# Patient Record
Sex: FEMALE | Race: WHITE | NOT HISPANIC OR LATINO | Employment: OTHER | RURAL
[De-identification: names, ages, dates, MRNs, and addresses within clinical notes are randomized per-mention and may not be internally consistent; named-entity substitution may affect disease eponyms.]

---

## 2018-07-18 ENCOUNTER — HISTORICAL (OUTPATIENT)
Dept: ADMINISTRATIVE | Facility: HOSPITAL | Age: 63
End: 2018-07-18

## 2018-07-20 LAB
LAB AP GENERAL CAT - HISTORICAL: NORMAL
LAB AP INTERPRETATION/RESULT - HISTORICAL: NEGATIVE
LAB AP SPECIMEN ADEQUACY - HISTORICAL: NORMAL
LAB AP SPECIMEN SUBMITTED - HISTORICAL: NORMAL

## 2020-08-06 ENCOUNTER — HISTORICAL (OUTPATIENT)
Dept: ADMINISTRATIVE | Facility: HOSPITAL | Age: 65
End: 2020-08-06

## 2020-08-06 LAB — SARS-COV+SARS-COV-2 AG RESP QL IA.RAPID: NEGATIVE

## 2020-08-12 ENCOUNTER — HISTORICAL (OUTPATIENT)
Dept: ADMINISTRATIVE | Facility: HOSPITAL | Age: 65
End: 2020-08-12

## 2020-08-12 LAB — SARS-COV+SARS-COV-2 AG RESP QL IA.RAPID: NEGATIVE

## 2020-10-09 ENCOUNTER — HISTORICAL (OUTPATIENT)
Dept: ADMINISTRATIVE | Facility: HOSPITAL | Age: 65
End: 2020-10-09

## 2020-10-10 LAB — SARS-COV+SARS-COV-2 AG RESP QL IA.RAPID: NEGATIVE

## 2020-10-15 ENCOUNTER — HISTORICAL (OUTPATIENT)
Dept: ADMINISTRATIVE | Facility: HOSPITAL | Age: 65
End: 2020-10-15

## 2021-01-27 ENCOUNTER — HISTORICAL (OUTPATIENT)
Dept: ADMINISTRATIVE | Facility: HOSPITAL | Age: 66
End: 2021-01-27

## 2021-01-27 LAB — SARS-COV+SARS-COV-2 AG RESP QL IA.RAPID: POSITIVE

## 2021-12-06 ENCOUNTER — HOSPITAL ENCOUNTER (OUTPATIENT)
Dept: RADIOLOGY | Facility: HOSPITAL | Age: 66
Discharge: HOME | End: 2021-12-06
Payer: COMMERCIAL

## 2021-12-06 VITALS — HEIGHT: 64 IN | WEIGHT: 148 LBS | BODY MASS INDEX: 25.27 KG/M2

## 2021-12-06 DIAGNOSIS — Z12.31 VISIT FOR SCREENING MAMMOGRAM: ICD-10-CM

## 2021-12-06 PROCEDURE — 77067 SCR MAMMO BI INCL CAD: CPT | Mod: 26,,, | Performed by: RADIOLOGY

## 2021-12-06 PROCEDURE — 77067 SCR MAMMO BI INCL CAD: CPT | Mod: TC

## 2021-12-06 PROCEDURE — 77067 MAMMO DIGITAL SCREENING BILAT: ICD-10-PCS | Mod: 26,,, | Performed by: RADIOLOGY

## 2022-01-13 RX ORDER — IBUPROFEN 800 MG/1
TABLET ORAL EVERY 4 HOURS PRN
COMMUNITY
Start: 2022-01-10

## 2022-01-13 RX ORDER — LEVOTHYROXINE, LIOTHYRONINE 19; 4.5 UG/1; UG/1
TABLET ORAL
COMMUNITY
Start: 2022-01-07

## 2022-01-13 RX ORDER — TRAZODONE HYDROCHLORIDE 100 MG/1
TABLET ORAL
COMMUNITY
Start: 2021-12-16

## 2022-01-13 RX ORDER — PROGESTERONE 200 MG/1
CAPSULE ORAL
COMMUNITY
Start: 2022-01-07

## 2022-01-13 RX ORDER — GUSELKUMAB 100 MG/ML
INJECTION SUBCUTANEOUS
COMMUNITY
Start: 2021-12-29

## 2022-01-13 RX ORDER — SECUKINUMAB 150 MG/ML
INJECTION SUBCUTANEOUS
Status: ON HOLD | COMMUNITY
Start: 2021-08-13 | End: 2022-01-14

## 2022-01-13 RX ORDER — OXYCODONE AND ACETAMINOPHEN 5; 325 MG/1; MG/1
TABLET ORAL
COMMUNITY
Start: 2022-01-10

## 2022-01-13 RX ORDER — MISOPROSTOL 200 UG/1
TABLET ORAL
Status: ON HOLD | COMMUNITY
Start: 2022-01-10 | End: 2022-01-14

## 2022-01-14 ENCOUNTER — HOSPITAL ENCOUNTER (OUTPATIENT)
Facility: HOSPITAL | Age: 67
Discharge: HOME | End: 2022-01-14
Attending: OBSTETRICS & GYNECOLOGY | Admitting: OBSTETRICS & GYNECOLOGY
Payer: COMMERCIAL

## 2022-01-14 ENCOUNTER — ANESTHESIA (OUTPATIENT)
Dept: SURGERY | Facility: HOSPITAL | Age: 67
End: 2022-01-14
Payer: COMMERCIAL

## 2022-01-14 ENCOUNTER — ANESTHESIA EVENT (OUTPATIENT)
Dept: SURGERY | Facility: HOSPITAL | Age: 67
End: 2022-01-14
Payer: COMMERCIAL

## 2022-01-14 VITALS
OXYGEN SATURATION: 97 % | SYSTOLIC BLOOD PRESSURE: 119 MMHG | HEIGHT: 64 IN | RESPIRATION RATE: 16 BRPM | BODY MASS INDEX: 26.12 KG/M2 | WEIGHT: 153 LBS | HEART RATE: 79 BPM | DIASTOLIC BLOOD PRESSURE: 50 MMHG | TEMPERATURE: 98 F

## 2022-01-14 DIAGNOSIS — N93.9 ABNORMAL UTERINE BLEEDING (AUB): ICD-10-CM

## 2022-01-14 DIAGNOSIS — N93.9 ABNORMAL UTERINE BLEEDING: ICD-10-CM

## 2022-01-14 DIAGNOSIS — N84.0 ENDOMETRIAL POLYP: ICD-10-CM

## 2022-01-14 PROCEDURE — 71000015 HC POSTOP RECOV 1ST HR: Performed by: OBSTETRICS & GYNECOLOGY

## 2022-01-14 PROCEDURE — 63600175 PHARM REV CODE 636 W HCPCS: Performed by: ANESTHESIOLOGY

## 2022-01-14 PROCEDURE — 37000008 HC ANESTHESIA 1ST 15 MINUTES: Performed by: OBSTETRICS & GYNECOLOGY

## 2022-01-14 PROCEDURE — D9220A PRA ANESTHESIA: Mod: ANES,,, | Performed by: ANESTHESIOLOGY

## 2022-01-14 PROCEDURE — 88305 TISSUE EXAM BY PATHOLOGIST: CPT | Mod: 26,,, | Performed by: PATHOLOGY

## 2022-01-14 PROCEDURE — 71000016 HC POSTOP RECOV ADDL HR: Performed by: OBSTETRICS & GYNECOLOGY

## 2022-01-14 PROCEDURE — C1782 MORCELLATOR: HCPCS | Performed by: OBSTETRICS & GYNECOLOGY

## 2022-01-14 PROCEDURE — 25000003 PHARM REV CODE 250: Performed by: ANESTHESIOLOGY

## 2022-01-14 PROCEDURE — 36000707: Performed by: OBSTETRICS & GYNECOLOGY

## 2022-01-14 PROCEDURE — 27201423 OPTIME MED/SURG SUP & DEVICES STERILE SUPPLY: Performed by: OBSTETRICS & GYNECOLOGY

## 2022-01-14 PROCEDURE — 37000009 HC ANESTHESIA EA ADD 15 MINS: Performed by: OBSTETRICS & GYNECOLOGY

## 2022-01-14 PROCEDURE — 88305 TISSUE EXAM BY PATHOLOGIST: CPT | Mod: SUR | Performed by: OBSTETRICS & GYNECOLOGY

## 2022-01-14 PROCEDURE — 36000706: Performed by: OBSTETRICS & GYNECOLOGY

## 2022-01-14 PROCEDURE — 27000510 HC BLANKET BAIR HUGGER ANY SIZE: Performed by: ANESTHESIOLOGY

## 2022-01-14 PROCEDURE — D9220A PRA ANESTHESIA: ICD-10-PCS | Mod: CRNA,,, | Performed by: ANESTHESIOLOGY

## 2022-01-14 PROCEDURE — D9220A PRA ANESTHESIA: ICD-10-PCS | Mod: ANES,,, | Performed by: ANESTHESIOLOGY

## 2022-01-14 PROCEDURE — 25000003 PHARM REV CODE 250: Performed by: OBSTETRICS & GYNECOLOGY

## 2022-01-14 PROCEDURE — 71000033 HC RECOVERY, INTIAL HOUR: Performed by: OBSTETRICS & GYNECOLOGY

## 2022-01-14 PROCEDURE — 88305 SURGICAL PATHOLOGY: ICD-10-PCS | Mod: 26,,, | Performed by: PATHOLOGY

## 2022-01-14 PROCEDURE — D9220A PRA ANESTHESIA: Mod: CRNA,,, | Performed by: ANESTHESIOLOGY

## 2022-01-14 PROCEDURE — 27000177 HC AIRWAY, LARYNGEAL MASK: Performed by: ANESTHESIOLOGY

## 2022-01-14 PROCEDURE — 27000716 HC OXISENSOR PROBE, ANY SIZE: Performed by: ANESTHESIOLOGY

## 2022-01-14 RX ORDER — SODIUM CHLORIDE 9 MG/ML
INJECTION, SOLUTION INTRAVENOUS CONTINUOUS
Status: DISCONTINUED | OUTPATIENT
Start: 2022-01-14 | End: 2022-01-14 | Stop reason: HOSPADM

## 2022-01-14 RX ORDER — PROCHLORPERAZINE EDISYLATE 5 MG/ML
5 INJECTION INTRAMUSCULAR; INTRAVENOUS EVERY 6 HOURS PRN
Status: DISCONTINUED | OUTPATIENT
Start: 2022-01-14 | End: 2022-01-14 | Stop reason: HOSPADM

## 2022-01-14 RX ORDER — ONDANSETRON 2 MG/ML
INJECTION INTRAMUSCULAR; INTRAVENOUS
Status: DISCONTINUED | OUTPATIENT
Start: 2022-01-14 | End: 2022-01-14

## 2022-01-14 RX ORDER — PROPOFOL 10 MG/ML
VIAL (ML) INTRAVENOUS
Status: DISCONTINUED | OUTPATIENT
Start: 2022-01-14 | End: 2022-01-14

## 2022-01-14 RX ORDER — LIDOCAINE HYDROCHLORIDE 10 MG/ML
1 INJECTION, SOLUTION EPIDURAL; INFILTRATION; INTRACAUDAL; PERINEURAL ONCE
Status: DISCONTINUED | OUTPATIENT
Start: 2022-01-14 | End: 2022-01-14 | Stop reason: HOSPADM

## 2022-01-14 RX ORDER — DIPHENHYDRAMINE HCL 25 MG
25 CAPSULE ORAL EVERY 4 HOURS PRN
Status: DISCONTINUED | OUTPATIENT
Start: 2022-01-14 | End: 2022-01-14 | Stop reason: HOSPADM

## 2022-01-14 RX ORDER — OXYCODONE HYDROCHLORIDE 5 MG/1
5 TABLET ORAL
Status: DISCONTINUED | OUTPATIENT
Start: 2022-01-14 | End: 2022-01-14 | Stop reason: HOSPADM

## 2022-01-14 RX ORDER — SODIUM CHLORIDE, SODIUM LACTATE, POTASSIUM CHLORIDE, CALCIUM CHLORIDE 600; 310; 30; 20 MG/100ML; MG/100ML; MG/100ML; MG/100ML
INJECTION, SOLUTION INTRAVENOUS CONTINUOUS
Status: DISCONTINUED | OUTPATIENT
Start: 2022-01-14 | End: 2022-01-14 | Stop reason: HOSPADM

## 2022-01-14 RX ORDER — DEXAMETHASONE SODIUM PHOSPHATE 4 MG/ML
INJECTION, SOLUTION INTRA-ARTICULAR; INTRALESIONAL; INTRAMUSCULAR; INTRAVENOUS; SOFT TISSUE
Status: DISCONTINUED | OUTPATIENT
Start: 2022-01-14 | End: 2022-01-14

## 2022-01-14 RX ORDER — SODIUM CHLORIDE, SODIUM LACTATE, POTASSIUM CHLORIDE, CALCIUM CHLORIDE 600; 310; 30; 20 MG/100ML; MG/100ML; MG/100ML; MG/100ML
125 INJECTION, SOLUTION INTRAVENOUS CONTINUOUS
Status: DISCONTINUED | OUTPATIENT
Start: 2022-01-14 | End: 2022-01-14 | Stop reason: HOSPADM

## 2022-01-14 RX ORDER — HYDROCODONE BITARTRATE AND ACETAMINOPHEN 5; 325 MG/1; MG/1
1 TABLET ORAL EVERY 4 HOURS PRN
Status: DISCONTINUED | OUTPATIENT
Start: 2022-01-14 | End: 2022-01-14 | Stop reason: HOSPADM

## 2022-01-14 RX ORDER — MIDAZOLAM HYDROCHLORIDE 1 MG/ML
INJECTION INTRAMUSCULAR; INTRAVENOUS
Status: DISCONTINUED | OUTPATIENT
Start: 2022-01-14 | End: 2022-01-14

## 2022-01-14 RX ORDER — CLINDAMYCIN PHOSPHATE 900 MG/50ML
INJECTION, SOLUTION INTRAVENOUS
Status: DISCONTINUED | OUTPATIENT
Start: 2022-01-14 | End: 2022-01-14

## 2022-01-14 RX ORDER — ONDANSETRON 2 MG/ML
4 INJECTION INTRAMUSCULAR; INTRAVENOUS DAILY PRN
Status: DISCONTINUED | OUTPATIENT
Start: 2022-01-14 | End: 2022-01-14 | Stop reason: HOSPADM

## 2022-01-14 RX ORDER — LIDOCAINE HYDROCHLORIDE 20 MG/ML
INJECTION, SOLUTION EPIDURAL; INFILTRATION; INTRACAUDAL; PERINEURAL
Status: DISCONTINUED | OUTPATIENT
Start: 2022-01-14 | End: 2022-01-14

## 2022-01-14 RX ORDER — HYDROMORPHONE HYDROCHLORIDE 2 MG/ML
0.5 INJECTION, SOLUTION INTRAMUSCULAR; INTRAVENOUS; SUBCUTANEOUS EVERY 5 MIN PRN
Status: COMPLETED | OUTPATIENT
Start: 2022-01-14 | End: 2022-01-14

## 2022-01-14 RX ORDER — FENTANYL CITRATE 50 UG/ML
INJECTION, SOLUTION INTRAMUSCULAR; INTRAVENOUS
Status: DISCONTINUED | OUTPATIENT
Start: 2022-01-14 | End: 2022-01-14

## 2022-01-14 RX ORDER — MEPERIDINE HYDROCHLORIDE 25 MG/ML
25 INJECTION INTRAMUSCULAR; INTRAVENOUS; SUBCUTANEOUS EVERY 10 MIN PRN
Status: DISCONTINUED | OUTPATIENT
Start: 2022-01-14 | End: 2022-01-14 | Stop reason: HOSPADM

## 2022-01-14 RX ORDER — ONDANSETRON 4 MG/1
8 TABLET, ORALLY DISINTEGRATING ORAL EVERY 8 HOURS PRN
Status: DISCONTINUED | OUTPATIENT
Start: 2022-01-14 | End: 2022-01-14 | Stop reason: HOSPADM

## 2022-01-14 RX ORDER — MORPHINE SULFATE 10 MG/ML
4 INJECTION INTRAMUSCULAR; INTRAVENOUS; SUBCUTANEOUS EVERY 5 MIN PRN
Status: DISCONTINUED | OUTPATIENT
Start: 2022-01-14 | End: 2022-01-14 | Stop reason: HOSPADM

## 2022-01-14 RX ORDER — DIPHENHYDRAMINE HYDROCHLORIDE 50 MG/ML
25 INJECTION INTRAMUSCULAR; INTRAVENOUS EVERY 4 HOURS PRN
Status: DISCONTINUED | OUTPATIENT
Start: 2022-01-14 | End: 2022-01-14 | Stop reason: HOSPADM

## 2022-01-14 RX ORDER — CEFAZOLIN SODIUM 2 G/50ML
2 SOLUTION INTRAVENOUS
Status: DISCONTINUED | OUTPATIENT
Start: 2022-01-14 | End: 2022-01-14 | Stop reason: HOSPADM

## 2022-01-14 RX ORDER — PROMETHAZINE HYDROCHLORIDE 12.5 MG/1
25 SUPPOSITORY RECTAL EVERY 6 HOURS PRN
Status: DISCONTINUED | OUTPATIENT
Start: 2022-01-14 | End: 2022-01-14 | Stop reason: HOSPADM

## 2022-01-14 RX ORDER — DIPHENHYDRAMINE HYDROCHLORIDE 50 MG/ML
25 INJECTION INTRAMUSCULAR; INTRAVENOUS EVERY 6 HOURS PRN
Status: DISCONTINUED | OUTPATIENT
Start: 2022-01-14 | End: 2022-01-14 | Stop reason: HOSPADM

## 2022-01-14 RX ADMIN — HYDROCODONE BITARTRATE AND ACETAMINOPHEN 1 TABLET: 5; 325 TABLET ORAL at 12:01

## 2022-01-14 RX ADMIN — MIDAZOLAM 2 MG: 1 INJECTION INTRAMUSCULAR; INTRAVENOUS at 09:01

## 2022-01-14 RX ADMIN — CLINDAMYCIN IN 5 PERCENT DEXTROSE 900 MG: 18 INJECTION, SOLUTION INTRAVENOUS at 10:01

## 2022-01-14 RX ADMIN — HYDROMORPHONE HYDROCHLORIDE 0.5 MG: 2 INJECTION INTRAMUSCULAR; INTRAVENOUS; SUBCUTANEOUS at 11:01

## 2022-01-14 RX ADMIN — ONDANSETRON 4 MG: 2 INJECTION INTRAMUSCULAR; INTRAVENOUS at 10:01

## 2022-01-14 RX ADMIN — SODIUM CHLORIDE: 9 INJECTION, SOLUTION INTRAVENOUS at 09:01

## 2022-01-14 RX ADMIN — LIDOCAINE HYDROCHLORIDE 100 MG: 20 INJECTION, SOLUTION INTRAVENOUS at 10:01

## 2022-01-14 RX ADMIN — FENTANYL CITRATE 50 MCG: 50 INJECTION INTRAMUSCULAR; INTRAVENOUS at 10:01

## 2022-01-14 RX ADMIN — PROPOFOL 150 MG: 10 INJECTION, EMULSION INTRAVENOUS at 10:01

## 2022-01-14 RX ADMIN — DEXAMETHASONE SODIUM PHOSPHATE 4 MG: 4 INJECTION, SOLUTION INTRA-ARTICULAR; INTRALESIONAL; INTRAMUSCULAR; INTRAVENOUS; SOFT TISSUE at 10:01

## 2022-01-14 NOTE — TRANSFER OF CARE
"Anesthesia Transfer of Care Note    Patient: Felicia Meza    Procedure(s) Performed: Procedure(s) (LRB):  HYSTEROSCOPY, WITH DILATION AND CURETTAGE OF UTERUS AND ENDOMETRIAL ABLATION, MYOSURE (N/A)    Patient location: PACU    Anesthesia Type: general    Transport from OR: Transported from OR on room air with adequate spontaneous ventilation    Post pain: adequate analgesia    Post assessment: no apparent anesthetic complications    Post vital signs: stable    Level of consciousness: awake and responds to stimulation    Nausea/Vomiting: no nausea/vomiting    Complications: none    Transfer of care protocol was followed      Last vitals:   Visit Vitals  /64 (BP Location: Left arm, Patient Position: Lying)   Pulse 81   Temp 36.8 °C (98.2 °F) (Oral)   Resp 12   Ht 5' 4" (1.626 m)   Wt 69.4 kg (153 lb)   SpO2 97%   Breastfeeding No   BMI 26.26 kg/m²     "

## 2022-01-14 NOTE — H&P
[facility] - Peri Services  History & Physical    Subjective:      Chief Complaint/Reason for Admission: d and C / Hysterosocpy / ablation for AUB    Felicia Meza is a 66 y.o. female who has had  postprtum bleeding with a thickened end stripe highly suggestive of a polyp.  Here for removal . All questions answered .     No past medical history on file.  No past surgical history on file.  No family history on file.       PTA Medications   Medication Sig    COSENTYX 150 mg/mL Syrg     ibuprofen (ADVIL,MOTRIN) 800 MG tablet     miSOPROStoL (CYTOTEC) 200 MCG Tab     NP THYROID 30 mg Tab     oxyCODONE-acetaminophen (PERCOCET) 5-325 mg per tablet     progesterone (PROMETRIUM) 200 MG capsule     traZODone (DESYREL) 100 MG tablet     TREMFYA 100 mg/mL AtIn      Review of patient's allergies indicates:  Not on File     Review of Systems   Constitutional: Negative.    HENT: Negative.    Eyes: Negative.    Respiratory: Negative.    Cardiovascular: Negative.    Gastrointestinal: Negative.    Genitourinary: Negative.    Musculoskeletal: Negative.    Skin: Negative.    Neurological: Negative.    Endo/Heme/Allergies: Negative.    Psychiatric/Behavioral: Negative.        Objective:      Vital Signs (Most Recent)       Vital Signs Range (Last 24H):       Physical Exam  Constitutional:       Appearance: She is normal weight.   HENT:      Head: Normocephalic.      Nose: Nose normal.      Mouth/Throat:      Mouth: Mucous membranes are moist.      Pharynx: Oropharynx is clear.   Eyes:      Pupils: Pupils are equal, round, and reactive to light.   Cardiovascular:      Rate and Rhythm: Normal rate and regular rhythm.   Pulmonary:      Effort: Pulmonary effort is normal.   Abdominal:      General: Abdomen is flat. Bowel sounds are normal.   Genitourinary:     General: Normal vulva.   Musculoskeletal:         General: Normal range of motion.      Cervical back: Normal range of motion.   Skin:     General: Skin is warm.    Neurological:      General: No focal deficit present.      Mental Status: She is alert.   Psychiatric:         Mood and Affect: Mood normal.         Behavior: Behavior normal.         Thought Content: Thought content normal.         Judgment: Judgment normal.         Data Review:     ECG:     Assessment:      There are no hospital problems to display for this patient.      Plan:    Felicia is here for postmenopausal bleeding as well as polyp removal ... procedrue explained in detail and all questions answered

## 2022-01-14 NOTE — ANESTHESIA PREPROCEDURE EVALUATION
01/14/2022  Felicia Meza is a 66 y.o., female.    Anesthesia Evaluation    I have reviewed the Patient Summary Reports.    I have reviewed the Nursing Notes. I have reviewed the NPO Status.   I have reviewed the Medications.     Review of Systems  Anesthesia Hx:  No problems with previous Anesthesia    Social:  Non-Smoker, No Alcohol Use    Hematology/Oncology:  Hematology Normal   Oncology Normal     EENT/Dental:EENT/Dental Normal   Cardiovascular:  Cardiovascular Normal     Pulmonary:  Pulmonary Normal    Renal/:  Renal/ Normal     Hepatic/GI:  Hepatic/GI Normal    Musculoskeletal:  Musculoskeletal Normal    Neurological:  Neurology Normal    Endocrine:   Hypothyroidism    Dermatological:  Skin Normal    Psych:  Psychiatric Normal           Physical Exam  General:  Well nourished    Airway/Jaw/Neck:  Airway Findings: Mouth Opening: Normal Mallampati: II     Eyes/Ears/Nose:  Eyes/Ears/Nose Findings:     Chest/Lungs:  Chest/Lungs Findings: Clear to auscultation     Heart/Vascular:  Heart Findings: Rate: Normal  Rhythm: Regular Rhythm        Mental Status:  Mental Status Findings:  Cooperative, Alert and Oriented         Anesthesia Plan  Type of Anesthesia, risks & benefits discussed:  Anesthesia Type:  general    Patient's Preference:   Plan Factors:          Intra-op Monitoring Plan: standard ASA monitors  Intra-op Monitoring Plan Comments:   Post Op Pain Control Plan: per primary service following discharge from PACU and multimodal analgesia  Post Op Pain Control Plan Comments:     Induction:   IV  Beta Blocker:  Patient is not currently on a Beta-Blocker (No further documentation required).       Informed Consent: Patient understands risks and agrees with Anesthesia plan.  Questions answered. Anesthesia consent signed with patient.  ASA Score: 2     Day of Surgery Review of History & Physical: I have  interviewed and examined the patient. I have reviewed the patient's H&P dated:  There are no significant changes.          Ready For Surgery From Anesthesia Perspective.

## 2022-01-14 NOTE — ANESTHESIA PROCEDURE NOTES
Intubation  Performed by: Madeleine Agee CRNA  Authorized by: Daniel Kingston MD     Intubation:     Induction:  Intravenous    Intubated:  Postinduction    Mask Ventilation:  Easy mask    Attempts:  1    Attempted By:  CRNA    Laryngeal View Grade: Grade I - full view of cords      Difficult Airway Encountered?: No      Complications:  None    Airway Device:  Supraglottic airway/LMA    Airway Device Size:  3.0    Placement Verified By:  Capnometry    Complicating Factors:  None    Findings Post-Intubation:  BS equal bilateral

## 2022-01-14 NOTE — ANESTHESIA POSTPROCEDURE EVALUATION
Anesthesia Post Evaluation    Patient: Felicia Meza    Procedure(s) Performed: Procedure(s) (LRB):  HYSTEROSCOPY, WITH DILATION AND CURETTAGE OF UTERUS AND ENDOMETRIAL ABLATION, MYOSURE (N/A)    Final Anesthesia Type: general      Patient location during evaluation: PACU  Patient participation: Yes- Able to Participate  Level of consciousness: awake and sedated  Post-procedure vital signs: reviewed and stable  Pain management: adequate  Airway patency: patent    PONV status at discharge: No PONV  Anesthetic complications: no      Cardiovascular status: blood pressure returned to baseline  Respiratory status: unassisted  Hydration status: euvolemic  Follow-up not needed.          Vitals Value Taken Time   /70 01/14/22 1243   Temp 36.8 °C (98.2 °F) 01/14/22 1101   Pulse 73 01/14/22 1256   Resp 16 01/14/22 1244   SpO2 93 % 01/14/22 1256   Vitals shown include unvalidated device data.      Event Time   Out of Recovery 11:47:12         Pain/Noah Score: Pain Rating Prior to Med Admin: 8 (1/14/2022 12:44 PM)  Noah Score: 9 (1/14/2022 11:46 AM)

## 2022-01-14 NOTE — OP NOTE
[facility]  General Surgery  Operative Note    SUMMARY     Date of Procedure: 1/14/2022     Procedure: Procedure(s) (LRB):  HYSTEROSCOPY, WITH DILATION AND CURETTAGE OF UTERUS AND ENDOMETRIAL ABLATION, MYOSURE (N/A)       Surgeon(s) and Role:     * Jazmyne Patrick,  - Primary    Assisting Surgeon: None    Pre-Operative Diagnosis: Endometrial polyp [N84.0]  Abnormal uterine bleeding [N93.9]    Post-Operative Diagnosis: Post-Op Diagnosis Codes:     * Endometrial polyp [N84.0]     * Abnormal uterine bleeding [N93.9]    Anesthesia: General    Operative Findings (including complications, if any): endometrial polyp x 2 with the myosure used to remove the polyp prior  To blation    Description of Technical Procedures: Pt was taken  back and prepped and draped in the usual sterile fashion ... she had the cervix already dilated  from her  cytotec and had the hysteroscope inserted followed  by the eval of the uterine  cavity with the polyp noted ... the hysterosocpe was diassembled for insertion of the myosure which was inserted with the polyp morcellated and removed followed by insertion of the neto for the uterine cautery cycle with the device inserted and then the safety test performed followed by cautery x 120 seconds follwed by removal and eval of the uterine cavity which showed a good cautery effect ... well tolerated      Significant Surgical Tasks Conducted by the Assistant(s), if Applicable: none     Estimated Blood Loss (EBL): * No values recorded between 1/14/2022 10:21 AM and 1/14/2022 10:55 AM *           Implants:     Specimens:   Specimen (24h ago, onward)             Start     Ordered    01/14/22 1043  Surgical Pathology  RELEASE UPON ORDERING         01/14/22 1043                        Condition: Good    Disposition: PACU - hemodynamically stable.    Attestation: I was present and scrubbed for the entire procedure.

## 2022-01-17 LAB
ESTROGEN SERPL-MCNC: NORMAL PG/ML
LAB AP GROSS DESCRIPTION: NORMAL
LAB AP LABORATORY NOTES: NORMAL
T3RU NFR SERPL: NORMAL %

## 2022-04-20 ENCOUNTER — OFFICE VISIT (OUTPATIENT)
Dept: DERMATOLOGY | Facility: CLINIC | Age: 67
End: 2022-04-20
Payer: COMMERCIAL

## 2022-04-20 VITALS — BODY MASS INDEX: 26.12 KG/M2 | WEIGHT: 153 LBS | HEIGHT: 64 IN | RESPIRATION RATE: 16 BRPM

## 2022-04-20 DIAGNOSIS — L40.9 PSORIASIS: ICD-10-CM

## 2022-04-20 DIAGNOSIS — L57.8 OTHER SKIN CHANGES DUE TO CHRONIC EXPOSURE TO NONIONIZING RADIATION: Primary | ICD-10-CM

## 2022-04-20 DIAGNOSIS — L57.0 AK (ACTINIC KERATOSIS): ICD-10-CM

## 2022-04-20 DIAGNOSIS — Z85.828 HISTORY OF BASAL CELL CARCINOMA (BCC): ICD-10-CM

## 2022-04-20 DIAGNOSIS — L82.1 SK (SEBORRHEIC KERATOSIS): ICD-10-CM

## 2022-04-20 DIAGNOSIS — L40.50 PSORIATIC ARTHRITIS: ICD-10-CM

## 2022-04-20 PROCEDURE — 17003 DESTRUCTION, PREMALIGNANT LESIONS; SECOND THROUGH 14 LESIONS: ICD-10-PCS | Mod: 59,,, | Performed by: DERMATOLOGY

## 2022-04-20 PROCEDURE — 3008F PR BODY MASS INDEX (BMI) DOCUMENTED: ICD-10-PCS | Mod: ,,, | Performed by: DERMATOLOGY

## 2022-04-20 PROCEDURE — 1159F MED LIST DOCD IN RCRD: CPT | Mod: ,,, | Performed by: DERMATOLOGY

## 2022-04-20 PROCEDURE — 17110 PR DESTRUCTION BENIGN LESIONS UP TO 14: ICD-10-PCS | Mod: ,,, | Performed by: DERMATOLOGY

## 2022-04-20 PROCEDURE — 17000 PR DESTRUCTION(LASER SURGERY,CRYOSURGERY,CHEMOSURGERY),PREMALIGNANT LESIONS,FIRST LESION: ICD-10-PCS | Mod: 59,,, | Performed by: DERMATOLOGY

## 2022-04-20 PROCEDURE — 99204 OFFICE O/P NEW MOD 45 MIN: CPT | Mod: 25,,, | Performed by: DERMATOLOGY

## 2022-04-20 PROCEDURE — 1159F PR MEDICATION LIST DOCUMENTED IN MEDICAL RECORD: ICD-10-PCS | Mod: ,,, | Performed by: DERMATOLOGY

## 2022-04-20 PROCEDURE — 99204 PR OFFICE/OUTPT VISIT, NEW, LEVL IV, 45-59 MIN: ICD-10-PCS | Mod: 25,,, | Performed by: DERMATOLOGY

## 2022-04-20 PROCEDURE — 17000 DESTRUCT PREMALG LESION: CPT | Mod: 59,,, | Performed by: DERMATOLOGY

## 2022-04-20 PROCEDURE — 17110 DESTRUCTION B9 LES UP TO 14: CPT | Mod: ,,, | Performed by: DERMATOLOGY

## 2022-04-20 PROCEDURE — 1160F PR REVIEW ALL MEDS BY PRESCRIBER/CLIN PHARMACIST DOCUMENTED: ICD-10-PCS | Mod: ,,, | Performed by: DERMATOLOGY

## 2022-04-20 PROCEDURE — 1160F RVW MEDS BY RX/DR IN RCRD: CPT | Mod: ,,, | Performed by: DERMATOLOGY

## 2022-04-20 PROCEDURE — 17003 DESTRUCT PREMALG LES 2-14: CPT | Mod: 59,,, | Performed by: DERMATOLOGY

## 2022-04-20 PROCEDURE — 3008F BODY MASS INDEX DOCD: CPT | Mod: ,,, | Performed by: DERMATOLOGY

## 2022-04-20 RX ORDER — CALCIUM CARBONATE 500(1250)
TABLET ORAL
COMMUNITY

## 2022-04-20 NOTE — PATIENT INSTRUCTIONS
Sunscreen Recommendations  I recommended a broad spectrum sunscreen with a SPF of 30 or higher that is water-resistant. SPF 30 sunscreens block approximately 97 percent of the sun's harmful rays.   Sunscreens should be applied at least 15 minutes prior to expected sun exposure and then every 90 minutes after that as long as sun exposure continues.   If swimming or exercising sunscreen should be reapplied every 45 minutes to an hour after getting wet or sweating.  One ounce, or the equivalent of a shot glass full of sunscreen, is adequate to protect the skin not covered by a bathing suit.   I also recommended a lip balm with a sunscreen as well.   Healthy Sun Protective Behaviors  Sun protective clothing can be used in lieu of sunscreen but must be worn the entire time you are exposed to the sun's rays.  Seek shade between 10 a.m. and 2 p.m.  Use extra caution near water, snow, or sand as they reflect sun rays  Avoid tanning beds and consider sunless self-tanning products instead  Perform monthly self skin exams     Cryotherapy  There will likely be a blister.   Clean the area daily with dial antibacterial soap and water.   Apply vaseline as needed.   The area will take 1-2 weeks to heal.

## 2022-04-20 NOTE — PROGRESS NOTES
[facility]   Socorro Blanc MD    Patient Name: Felicia Meza  Patient YOB: 1955   Date of Service: 4/20/22    CC: Full Skin Exam    HPI: Felicia Meza is a 66 y.o. female presents today for a full skin exam.  Patient has been seen by a dermatologist in the past and dermatologic history includes AKs and BCC. Patient is concerned today about a lesion located on the face.  It has been present for 1 week(s). It has not been treated in the past.  Patient is also concerned about lesions on the right eyebrow.    Past Medical History:   Diagnosis Date    Arthritis      Past Surgical History:   Procedure Laterality Date    HYSTEROSCOPY WITH HYDROTHERMAL ABLATION OF ENDOMETRIUM WITH DILATION AND CURETTAGE N/A 1/14/2022    Procedure: HYSTEROSCOPY, WITH DILATION AND CURETTAGE OF UTERUS AND ENDOMETRIAL ABLATION, MYOSURE;  Surgeon: Jazmyne Patrick DO;  Location: [facility];  Service: OB/GYN;  Laterality: N/A;    SKIN CANCER EXCISION      TONSILLECTOMY, ADENOIDECTOMY      TUBAL LIGATION       Review of patient's allergies indicates:   Allergen Reactions    Bactrim [sulfamethoxazole-trimethoprim] Other (See Comments)     Swelling and whelps     Penicillins Rash and Blisters       Current Outpatient Medications:     calcium carbonate (OS-SIDDHARTH) 500 mg calcium (1,250 mg) tablet, 1 tablet with meals, Disp: , Rfl:     ibuprofen (ADVIL,MOTRIN) 800 MG tablet, every 4 (four) hours as needed., Disp: , Rfl:     NP THYROID 30 mg Tab, , Disp: , Rfl:     oxyCODONE-acetaminophen (PERCOCET) 5-325 mg per tablet, , Disp: , Rfl:     progesterone (PROMETRIUM) 200 MG capsule, , Disp: , Rfl:     traZODone (DESYREL) 100 MG tablet, Take by mouth., Disp: , Rfl:     TREMFYA 100 mg/mL AtIn, , Disp: , Rfl:     ROS: A focused review of systems was obtained and negative.     Exam: A full skin exam was performed including scalp, hair, face, neck, chest, back, abdomen, right arm, left arm, right hand, left hand, nails,  right leg, and left leg.  All areas examined were normal expect as per below in assessment and plan.  General Appearance of the patient is well developed and well nourished.  Orientation: alert and oriented x 3.  Mood and affect: pleasant.    Assessment:   The primary encounter diagnosis was Other skin changes due to chronic exposure to nonionizing radiation. Diagnoses of Psoriasis, Psoriatic arthritis, SK (seborrheic keratosis), AK (actinic keratosis), and History of basal cell carcinoma (BCC) were also pertinent to this visit.    Plan:      Seborrheic Keratosis (L82.1)  - Stuck-on, warty, greasy brown papule with pseudo-horn cysts scattered on the trunk and extremities    Plan: Counseling.  I counseled the patient regarding the following:  Skin Care: Seborrheic Keratoses are benign. No treatment is necessary.  Expectations: Seborrheic Keratoses are benign warty growths. Patients get more of them as they age    Plan: Reassurance    Actinic Keratoses(L57.0)  - Erythematous patches and papules with hyperkeratotic scale distributed on the upper lip and face.    Plan: Counseling.  I counseled the patient regarding the following:  Skin Care: Sun protective clothing and broad spectrum sunscreen can prevent the formation of Actinic  Keratoses. AKs can resolve with cryotherapy, photodynamic therapy, imiquimod, topical 5-FU.  Expectations: Actinic Keratoses are precancerous proliferations that occur within sun damaged skin. If untreated,  a small subset of AKs can develop into Squamous Cell Carcinoma.  Contact Office if: If AKs fail to resolve despite treatment, or if you develop a side effect from therapy, such as  unbearable crusting, scabbing, redness and tenderness.    Plan: Liquid Nitrogen.  A total of 4 lesions were treated with liquid nitrogen for 2 freeze-thaw cycles lasting 5 seconds, located on the above locations.   The patient's consent was obtained including but not limited to risks of crusting,  scabbing,  blistering, scarring, darker or lighter pigmentary change, recurrence, incomplete removal and infection.    Irritated Seborrheic Keratoses (L82.0)  Stuck-on inflamed papules with crust located on the upper back  Associated diagnoses: Pruritus and Cutaneous Inflammation    Plan: Liquid Nitrogen.  A total of 5 lesions were treated with liquid nitrogen, located on the above listed location.  This procedure was medically necessary because the lesions that were treated were: irritated and itchy. The  patient's consent was obtained including but not limited to risks of crusting, scabbing, blistering, scarring, darker  or lighter pigmentary change, recurrence, incomplete removal and infection.      Plaque Psoriasis  - psoriasiform plaques with micaceous scale  Status: well controlled    Plan: Counseling  I counseled the patient regarding the following:  Skin care: Emollients, ambient sun exposure, shampoos with tar, selenium or zinc pyrithione can improve psoriasis.  Expectations: Psoriasis is chronic in nature with periods of remissions and flares. Flares can be triggered by stress, infections (group A strep), certain medications and alcohol.  Contact office if: Psoriasis worsens, or fails to improve despite several months of treatment.    - continue tremfya    Psoriatic Arthritis  - Psoriasiform plaques with micaceous scale  Status: Well controlled    Plan: Counseling.  I counseled the patient regarding the following:  Instructions: Systemic medications are the treatment of choice for psoriatic arthritis. Treatment options include  anti-TNF therapy and methotrexate.  Expectations: Psoriatic arthritis is a type of inflammatory arthritis which occurs in conjunction with psoriasis.  Approximately 5% of psoriasis patients develop psoriatic arthritis. Psoriatic arthritis is chronic in nature with  periods of remissions and flares. Flares can be triggered by stress, infections (group A strep), certain  medications  and alcohol. Psoriatic arthritis requires systemic medication for adequate treatment.  Contact office if: Your psoriatic arthritis worsens, or fails to improve despite several months of treatment.  - Currently on Tremfya per Dr. Perez.    History of non-melanoma skin cancer (Z85.828)  - Well healed scar with NER  Associated diagnosis: Medical surveillance following completed treatment    Plan: Monitoring.    Other Skin Changes Due to Chronic Exposure of Nonionizing Radiation (L57.8)    Plan: Monitoring.     Plan: Sunscreen Recommendations.  I recommended a broad spectrum sunscreen with a SPF of 30 or higher. I explained that SPF 30 sunscreens block approximately 97 percent of the  sun's harmful rays. Sunscreens should be applied at least 15 minutes prior to expected sun exposure and then every 2 hours after that as long as  sun exposure continues. If swimming or exercising sunscreen should be reapplied every 45 minutes to an hour after getting wet or sweating. One  ounce, or the equivalent of a shot glass full of sunscreen, is adequate to protect the skin not covered by a bathing suit. I also recommended a lip  balm with a sunscreen as well. Sun protective clothing can be used in lieu of sunscreen but must be worn the entire time you are exposed to the  sun's rays.    Follow up in about 6 months (around 10/20/2022) for FSE.    Socorro Blanc MD

## 2022-08-16 NOTE — PATIENT INSTRUCTIONS
Sunscreen Recommendations  I recommended a broad spectrum sunscreen with a SPF of 30 or higher that is water-resistant. SPF 30 sunscreens block approximately 97 percent of the sun's harmful rays.   Sunscreens should be applied at least 15 minutes prior to expected sun exposure and then every 90 minutes after that as long as sun exposure continues.   If swimming or exercising sunscreen should be reapplied every 45 minutes to an hour after getting wet or sweating.  One ounce, or the equivalent of a shot glass full of sunscreen, is adequate to protect the skin not covered by a bathing suit.   I also recommended a lip balm with a sunscreen as well.   Healthy Sun Protective Behaviors  Sun protective clothing can be used in lieu of sunscreen but must be worn the entire time you are exposed to the sun's rays.  Seek shade between 10 a.m. and 2 p.m.  Use extra caution near water, snow, or sand as they reflect sun rays  Avoid tanning beds and consider sunless self-tanning products instead  Perform monthly self skin exams

## 2022-08-22 ENCOUNTER — OFFICE VISIT (OUTPATIENT)
Dept: DERMATOLOGY | Facility: CLINIC | Age: 67
End: 2022-08-22
Payer: COMMERCIAL

## 2022-08-22 DIAGNOSIS — L57.8 OTHER SKIN CHANGES DUE TO CHRONIC EXPOSURE TO NONIONIZING RADIATION: ICD-10-CM

## 2022-08-22 DIAGNOSIS — Z79.899 HIGH RISK MEDICATION USE: ICD-10-CM

## 2022-08-22 DIAGNOSIS — L40.9 PSORIASIS: Primary | ICD-10-CM

## 2022-08-22 DIAGNOSIS — L82.1 SEBORRHEIC KERATOSES: ICD-10-CM

## 2022-08-22 DIAGNOSIS — L82.0 SEBORRHEIC KERATOSES, INFLAMED: ICD-10-CM

## 2022-08-22 DIAGNOSIS — L40.50 PSORIATIC ARTHRITIS: ICD-10-CM

## 2022-08-22 PROCEDURE — 1159F MED LIST DOCD IN RCRD: CPT | Mod: ,,, | Performed by: DERMATOLOGY

## 2022-08-22 PROCEDURE — 17110 DESTRUCTION B9 LES UP TO 14: CPT | Mod: ,,, | Performed by: DERMATOLOGY

## 2022-08-22 PROCEDURE — 99214 OFFICE O/P EST MOD 30 MIN: CPT | Mod: 25,,, | Performed by: DERMATOLOGY

## 2022-08-22 PROCEDURE — 17110 PR DESTRUCTION BENIGN LESIONS UP TO 14: ICD-10-PCS | Mod: ,,, | Performed by: DERMATOLOGY

## 2022-08-22 PROCEDURE — 1160F RVW MEDS BY RX/DR IN RCRD: CPT | Mod: ,,, | Performed by: DERMATOLOGY

## 2022-08-22 PROCEDURE — 1159F PR MEDICATION LIST DOCUMENTED IN MEDICAL RECORD: ICD-10-PCS | Mod: ,,, | Performed by: DERMATOLOGY

## 2022-08-22 PROCEDURE — 99214 PR OFFICE/OUTPT VISIT, EST, LEVL IV, 30-39 MIN: ICD-10-PCS | Mod: 25,,, | Performed by: DERMATOLOGY

## 2022-08-22 PROCEDURE — 1160F PR REVIEW ALL MEDS BY PRESCRIBER/CLIN PHARMACIST DOCUMENTED: ICD-10-PCS | Mod: ,,, | Performed by: DERMATOLOGY

## 2022-08-22 NOTE — PROGRESS NOTES
[facility]   Socorro Blanc MD    Patient Name: Felicia Meza  Patient YOB: 1955   Date of Service: 8/22/22    CC: Follow-up Psoriasis    HPI: Felicia Meza is a 66 y.o. female here today for follow-up of psoriasis, last seen 04/22.  Previous treatments include Tremfya.  Overall, the psoriasis is improved.  Treatment plan was followed as directed. Patient is also concerned about a lesion on the scalp that has been present for about 6 months and lesions on the left chest, forehead, and lower back    Past Medical History:   Diagnosis Date    Arthritis      Past Surgical History:   Procedure Laterality Date    HYSTEROSCOPY WITH HYDROTHERMAL ABLATION OF ENDOMETRIUM WITH DILATION AND CURETTAGE N/A 1/14/2022    Procedure: HYSTEROSCOPY, WITH DILATION AND CURETTAGE OF UTERUS AND ENDOMETRIAL ABLATION, MYOSURE;  Surgeon: Jazmyne Patrick DO;  Location: [facility];  Service: OB/GYN;  Laterality: N/A;    SKIN CANCER EXCISION      TONSILLECTOMY, ADENOIDECTOMY      TUBAL LIGATION       Review of patient's allergies indicates:   Allergen Reactions    Bactrim [sulfamethoxazole-trimethoprim] Other (See Comments)     Swelling and whelps     Penicillins Rash and Blisters       Current Outpatient Medications:     calcium carbonate (OS-SIDDHARTH) 500 mg calcium (1,250 mg) tablet, 1 tablet with meals, Disp: , Rfl:     ibuprofen (ADVIL,MOTRIN) 800 MG tablet, every 4 (four) hours as needed., Disp: , Rfl:     NP THYROID 30 mg Tab, , Disp: , Rfl:     oxyCODONE-acetaminophen (PERCOCET) 5-325 mg per tablet, , Disp: , Rfl:     progesterone (PROMETRIUM) 200 MG capsule, , Disp: , Rfl:     traZODone (DESYREL) 100 MG tablet, Take by mouth., Disp: , Rfl:     TREMFYA 100 mg/mL AtIn, , Disp: , Rfl:     ROS: A focused review of systems was obtained and negative.     Exam: A focused skin exam was performed. All areas examined were normal except as mentioned in the assessment and plan below.  General Appearance of the  patient is well developed and well nourished.  Orientation: alert and oriented x 3.  Mood and affect: pleasant.    Assessment:   The primary encounter diagnosis was Psoriasis. Diagnoses of Psoriatic arthritis, High risk medication use, Other skin changes due to chronic exposure to nonionizing radiation, Seborrheic keratoses, and Seborrheic keratoses, inflamed were also pertinent to this visit.    Plan:      Plaque Psoriasis  - clear   Status: Stable    Plan: Counseling  I counseled the patient regarding the following:  Skin care: Emollients, ambient sun exposure, shampoos with tar, selenium or zinc pyrithione can improve psoriasis.  Expectations: Psoriasis is chronic in nature with periods of remissions and flares. Flares can be triggered by stress, infections (group A strep), certain medications and alcohol.  Contact office if: Psoriasis worsens, or fails to improve despite several months of treatment.    Psoriatic Arthritis  - clear  Status: Stable    Plan: Counseling.  I counseled the patient regarding the following:  Instructions: Systemic medications are the treatment of choice for psoriatic arthritis. Treatment options include  anti-TNF therapy and methotrexate.  Expectations: Psoriatic arthritis is a type of inflammatory arthritis which occurs in conjunction with psoriasis.  Approximately 5% of psoriasis patients develop psoriatic arthritis. Psoriatic arthritis is chronic in nature with  periods of remissions and flares. Flares can be triggered by stress, infections (group A strep), certain medications  and alcohol. Psoriatic arthritis requires systemic medication for adequate treatment.  Contact office if: Your psoriatic arthritis worsens, or fails to improve despite several months of treatment.    - continue FU with Dr. Perez    High Risk Medication Monitoring (Z79.899) : The risks and benefits of the medication were reviewed in full with the patient. Should any side effects occur, the patient will stop the  medication and contact me immediately.    - quant gold per Dr. Perez    Other Skin Changes Due to Chronic Exposure of Nonionizing Radiation (L57.8)    Plan: Monitoring.     Plan: Sunscreen Recommendations.  I recommended a broad spectrum sunscreen with a SPF of 30 or higher. I explained that SPF 30 sunscreens block approximately 97 percent of the  sun's harmful rays. Sunscreens should be applied at least 15 minutes prior to expected sun exposure and then every 2 hours after that as long as  sun exposure continues. If swimming or exercising sunscreen should be reapplied every 45 minutes to an hour after getting wet or sweating. One  ounce, or the equivalent of a shot glass full of sunscreen, is adequate to protect the skin not covered by a bathing suit. I also recommended a lip  balm with a sunscreen as well. Sun protective clothing can be used in lieu of sunscreen but must be worn the entire time you are exposed to the  sun's rays.    Seborrheic Keratosis (L82.1)  - Stuck-on, warty, greasy brown papule with pseudo-horn cysts scattered on the trunk and extremities    Plan: Counseling.  I counseled the patient regarding the following:  Skin Care: Seborrheic Keratoses are benign. No treatment is necessary.  Expectations: Seborrheic Keratoses are benign warty growths. Patients get more of them as they age    Plan: Reassurance    Irritated Seborrheic Keratoses (L82.0)  Stuck-on inflamed papules with crust located on the back and left flank  Associated diagnoses: Pruritus and Cutaneous Inflammation    Plan: Liquid Nitrogen.  A total of 3 lesions were treated with liquid nitrogen, located on the above listed location.  This procedure was medically necessary because the lesions that were treated were: irritated and itchy. The  patient's consent was obtained including but not limited to risks of crusting, scabbing, blistering, scarring, darker  or lighter pigmentary change, recurrence, incomplete removal and  infection.      Follow up in about 6 months (around 2/22/2023).    Socorro Blanc MD

## 2022-10-18 ENCOUNTER — OFFICE VISIT (OUTPATIENT)
Dept: DERMATOLOGY | Facility: CLINIC | Age: 67
End: 2022-10-18
Payer: COMMERCIAL

## 2022-10-18 DIAGNOSIS — L57.8 OTHER SKIN CHANGES DUE TO CHRONIC EXPOSURE TO NONIONIZING RADIATION: Primary | ICD-10-CM

## 2022-10-18 DIAGNOSIS — Z85.828 HISTORY OF NONMELANOMA SKIN CANCER: ICD-10-CM

## 2022-10-18 DIAGNOSIS — L40.50 PSORIATIC ARTHRITIS: ICD-10-CM

## 2022-10-18 DIAGNOSIS — L57.0 AK (ACTINIC KERATOSIS): ICD-10-CM

## 2022-10-18 DIAGNOSIS — B07.9 VERRUCA VULGARIS: ICD-10-CM

## 2022-10-18 DIAGNOSIS — Z79.899 HIGH RISK MEDICATION USE: ICD-10-CM

## 2022-10-18 DIAGNOSIS — L82.1 SEBORRHEIC KERATOSES: ICD-10-CM

## 2022-10-18 DIAGNOSIS — L40.9 PSORIASIS: ICD-10-CM

## 2022-10-18 PROCEDURE — 17000 DESTRUCT PREMALG LESION: CPT | Mod: XS,,, | Performed by: DERMATOLOGY

## 2022-10-18 PROCEDURE — 17110 PR DESTRUCTION BENIGN LESIONS UP TO 14: ICD-10-PCS | Mod: ,,, | Performed by: DERMATOLOGY

## 2022-10-18 PROCEDURE — 1159F PR MEDICATION LIST DOCUMENTED IN MEDICAL RECORD: ICD-10-PCS | Mod: ,,, | Performed by: DERMATOLOGY

## 2022-10-18 PROCEDURE — 99214 PR OFFICE/OUTPT VISIT, EST, LEVL IV, 30-39 MIN: ICD-10-PCS | Mod: 25,,, | Performed by: DERMATOLOGY

## 2022-10-18 PROCEDURE — 1159F MED LIST DOCD IN RCRD: CPT | Mod: ,,, | Performed by: DERMATOLOGY

## 2022-10-18 PROCEDURE — 17110 DESTRUCTION B9 LES UP TO 14: CPT | Mod: ,,, | Performed by: DERMATOLOGY

## 2022-10-18 PROCEDURE — 17000 PR DESTRUCTION(LASER SURGERY,CRYOSURGERY,CHEMOSURGERY),PREMALIGNANT LESIONS,FIRST LESION: ICD-10-PCS | Mod: XS,,, | Performed by: DERMATOLOGY

## 2022-10-18 PROCEDURE — 1160F RVW MEDS BY RX/DR IN RCRD: CPT | Mod: ,,, | Performed by: DERMATOLOGY

## 2022-10-18 PROCEDURE — 1160F PR REVIEW ALL MEDS BY PRESCRIBER/CLIN PHARMACIST DOCUMENTED: ICD-10-PCS | Mod: ,,, | Performed by: DERMATOLOGY

## 2022-10-18 PROCEDURE — 99214 OFFICE O/P EST MOD 30 MIN: CPT | Mod: 25,,, | Performed by: DERMATOLOGY

## 2022-10-18 RX ORDER — CLOBETASOL PROPIONATE 0.5 MG/G
CREAM TOPICAL
Qty: 60 G | Refills: 2 | Status: SHIPPED | OUTPATIENT
Start: 2022-10-18 | End: 2022-12-19 | Stop reason: SDUPTHER

## 2022-10-18 NOTE — PROGRESS NOTES
[facility]   Socorro Blanc MD    Patient Name: Felicia Meza  Patient YOB: 1955   Date of Service: 10/18/22    CC: Full Skin Exam    HPI: Felicia Meza is a 66 y.o. female presents today for a full skin exam.  Patient was last seen 04/22 and dermatologic history includes BCC and AKs. Patient is concerned today about a lesion located on the low back.  It has been present for 6 month(s). It has been treated in the past.  Patient is also concerned about psoriasis.    Past Medical History:   Diagnosis Date    Arthritis      Past Surgical History:   Procedure Laterality Date    HYSTEROSCOPY WITH HYDROTHERMAL ABLATION OF ENDOMETRIUM WITH DILATION AND CURETTAGE N/A 1/14/2022    Procedure: HYSTEROSCOPY, WITH DILATION AND CURETTAGE OF UTERUS AND ENDOMETRIAL ABLATION, MYOSURE;  Surgeon: Jazmyne Patrick DO;  Location: [facility];  Service: OB/GYN;  Laterality: N/A;    SKIN CANCER EXCISION      TONSILLECTOMY, ADENOIDECTOMY      TUBAL LIGATION       Review of patient's allergies indicates:   Allergen Reactions    Bactrim [sulfamethoxazole-trimethoprim] Other (See Comments)     Swelling and whelps     Penicillins Rash and Blisters       Current Outpatient Medications:     calcium carbonate (OS-SIDDHARTH) 500 mg calcium (1,250 mg) tablet, 1 tablet with meals, Disp: , Rfl:     clobetasoL (TEMOVATE) 0.05 % cream, Apply to affected area twice daily, tapering with improvement, Disp: 60 g, Rfl: 2    ibuprofen (ADVIL,MOTRIN) 800 MG tablet, every 4 (four) hours as needed., Disp: , Rfl:     NP THYROID 30 mg Tab, , Disp: , Rfl:     oxyCODONE-acetaminophen (PERCOCET) 5-325 mg per tablet, , Disp: , Rfl:     progesterone (PROMETRIUM) 200 MG capsule, , Disp: , Rfl:     traZODone (DESYREL) 100 MG tablet, Take by mouth., Disp: , Rfl:     TREMFYA 100 mg/mL AtIn, , Disp: , Rfl:     ROS: A focused review of systems was obtained and negative.     Exam: A full skin exam was performed including scalp, hair, face, neck, chest,  back, abdomen, right arm, left arm, right hand, left hand, nails, right leg, and left leg.  All areas examined were normal expect as per below in assessment and plan.  General Appearance of the patient is well developed and well nourished.  Orientation: alert and oriented x 3.  Mood and affect: pleasant.    Assessment:   The primary encounter diagnosis was Other skin changes due to chronic exposure to nonionizing radiation. Diagnoses of Psoriasis, Psoriatic arthritis, Seborrheic keratoses, High risk medication use, History of nonmelanoma skin cancer, Verruca vulgaris, and AK (actinic keratosis) were also pertinent to this visit.    Plan:   Medications Ordered This Encounter   Medications    clobetasoL (TEMOVATE) 0.05 % cream     Sig: Apply to affected area twice daily, tapering with improvement     Dispense:  60 g     Refill:  2       Seborrheic Keratosis (L82.1)  - Stuck-on, warty, greasy brown papule with pseudo-horn cysts scattered on the trunk and extremities    Plan: Counseling.  I counseled the patient regarding the following:  Skin Care: Seborrheic Keratoses are benign. No treatment is necessary.  Expectations: Seborrheic Keratoses are benign warty growths. Patients get more of them as they age    Plan: Reassurance    Actinic Keratoses(L57.0)  - Erythematous patches and papules with hyperkeratotic scale distributed on the right forearm.    Plan: Counseling.  I counseled the patient regarding the following:  Skin Care: Sun protective clothing and broad spectrum sunscreen can prevent the formation of Actinic  Keratoses. AKs can resolve with cryotherapy, photodynamic therapy, imiquimod, topical 5-FU.  Expectations: Actinic Keratoses are precancerous proliferations that occur within sun damaged skin. If untreated,  a small subset of AKs can develop into Squamous Cell Carcinoma.  Contact Office if: If AKs fail to resolve despite treatment, or if you develop a side effect from therapy, such as  unbearable crusting,  scabbing, redness and tenderness.    Plan: Liquid Nitrogen.  A total of 1 lesions were treated with liquid nitrogen for 2 freeze-thaw cycles lasting 5 seconds, located on the above locations.   The patient's consent was obtained including but not limited to risks of crusting, scabbing,  blistering, scarring, darker or lighter pigmentary change, recurrence, incomplete removal and infection.      Verruca Vulgaris  - verrucous papules with thrombosed capillary loops located on the lower back  Associated diagnoses: Cutaneous Inflammation and Lesions are Contagious    Plan: Counseling  I counseled the patient regarding the following:  Skin Care: Verruca Vulgaris can be treated with retinoids, aldara, salicylic acid preparations or cryotherapy.  Expectations: Verruca Vulgaris are cauliflower-like bumps caused by viral infections. They can be spread through direct contact and usually resolve with treatment.  Contact Office if: The warts spread, or recur despite treatment.  Warts are stubborn and may require multiple treatments.    Plan: Liquid Nitrogen  A total of 1 lesion(s) was treated with liquid nitrogen, located on the above listed location.  This procedure was medically necessary because the lesions that were treated were: enlarging, inflamed, and contagious. The patient's consent was obtained including but not limited to risks of crusting, scabbing, blistering, scarring, darker or lighter pigmentary change, recurrence, incomplete removal and infection.     Plaque Psoriasis  - psoriasiform plaques with micaceous scale  Status: Stable      Plan: Counseling  I counseled the patient regarding the following:  Skin care: Emollients, ambient sun exposure, shampoos with tar, selenium or zinc pyrithione can improve psoriasis.  Expectations: Psoriasis is chronic in nature with periods of remissions and flares. Flares can be triggered by stress, infections (group A strep), certain medications and alcohol.  Contact office if:  Psoriasis worsens, or fails to improve despite several months of treatment.    Psoriatic Arthritis  - Psoriasiform plaques with micaceous scale  Status: Stable      Plan: Counseling.  I counseled the patient regarding the following:  Instructions: Systemic medications are the treatment of choice for psoriatic arthritis. Treatment options include  anti-TNF therapy and methotrexate.  Expectations: Psoriatic arthritis is a type of inflammatory arthritis which occurs in conjunction with psoriasis.  Approximately 5% of psoriasis patients develop psoriatic arthritis. Psoriatic arthritis is chronic in nature with  periods of remissions and flares. Flares can be triggered by stress, infections (group A strep), certain medications  and alcohol. Psoriatic arthritis requires systemic medication for adequate treatment.  Contact office if: Your psoriatic arthritis worsens, or fails to improve despite several months of treatment.    History of non-melanoma skin cancer (Z85.828)  - Well healed scar with NER  Associated diagnosis: Medical surveillance following completed treatment    Plan: Monitoring.    Other Skin Changes Due to Chronic Exposure of Nonionizing Radiation (L57.8)    Plan: Monitoring.     Plan: Sunscreen Recommendations.  I recommended a broad spectrum sunscreen with a SPF of 30 or higher. I explained that SPF 30 sunscreens block approximately 97 percent of the  sun's harmful rays. Sunscreens should be applied at least 15 minutes prior to expected sun exposure and then every 2 hours after that as long as  sun exposure continues. If swimming or exercising sunscreen should be reapplied every 45 minutes to an hour after getting wet or sweating. One  ounce, or the equivalent of a shot glass full of sunscreen, is adequate to protect the skin not covered by a bathing suit. I also recommended a lip  balm with a sunscreen as well. Sun protective clothing can be used in lieu of sunscreen but must be worn the entire time you  are exposed to the  sun's rays.    High Risk Medication Monitoring (Z79.899) : The risks and benefits of the medication were reviewed in full with the patient. Should any side effects occur, the patient will stop the medication and contact me immediately.    I discussed with the patient the risks of Tremfya including but not limited to immunosuppression, serious infections, and drug reactions. The patient understands that monitoring is required including a PPD at baseline and must alert us or the primary physician if symptoms of infection or other concerning signs are noted.      Follow up in about 3 weeks (around 11/8/2022) for Warts and 6 months.    Socorro Blanc MD

## 2022-12-19 DIAGNOSIS — L40.9 PSORIASIS: ICD-10-CM

## 2022-12-19 RX ORDER — CLOBETASOL PROPIONATE 0.5 MG/G
CREAM TOPICAL
Qty: 60 G | Refills: 2 | Status: SHIPPED | OUTPATIENT
Start: 2022-12-19 | End: 2023-02-27 | Stop reason: SDUPTHER

## 2023-02-27 ENCOUNTER — OFFICE VISIT (OUTPATIENT)
Dept: DERMATOLOGY | Facility: CLINIC | Age: 68
End: 2023-02-27
Payer: MEDICARE

## 2023-02-27 VITALS — WEIGHT: 153 LBS | HEIGHT: 64 IN | BODY MASS INDEX: 26.12 KG/M2

## 2023-02-27 DIAGNOSIS — L40.9 PSORIASIS: ICD-10-CM

## 2023-02-27 DIAGNOSIS — L40.50 PSORIATIC ARTHRITIS: ICD-10-CM

## 2023-02-27 DIAGNOSIS — L57.8 OTHER SKIN CHANGES DUE TO CHRONIC EXPOSURE TO NONIONIZING RADIATION: Primary | ICD-10-CM

## 2023-02-27 DIAGNOSIS — Z85.828 HISTORY OF NONMELANOMA SKIN CANCER: ICD-10-CM

## 2023-02-27 DIAGNOSIS — L82.1 SK (SEBORRHEIC KERATOSIS): ICD-10-CM

## 2023-02-27 PROCEDURE — 99214 OFFICE O/P EST MOD 30 MIN: CPT | Mod: ,,, | Performed by: DERMATOLOGY

## 2023-02-27 PROCEDURE — 99214 PR OFFICE/OUTPT VISIT, EST, LEVL IV, 30-39 MIN: ICD-10-PCS | Mod: ,,, | Performed by: DERMATOLOGY

## 2023-02-27 RX ORDER — GABAPENTIN 100 MG/1
1 CAPSULE ORAL
COMMUNITY

## 2023-02-27 RX ORDER — CLOBETASOL PROPIONATE 0.5 MG/G
CREAM TOPICAL
Qty: 60 G | Refills: 2 | Status: SHIPPED | OUTPATIENT
Start: 2023-02-27 | End: 2023-09-28 | Stop reason: SDUPTHER

## 2023-02-27 RX ORDER — RISANKIZUMAB-RZAA 150 MG/ML
INJECTION SUBCUTANEOUS
COMMUNITY
Start: 2023-02-24

## 2023-02-27 NOTE — Clinical Note
Will you please refer pt to Dr. Hdz to establish care for psoriatic arthritis since Dr. Perez is leaving?

## 2023-02-27 NOTE — PROGRESS NOTES
[facility]   Socorro Blanc MD    Patient Name: Felicia Meza  Patient YOB: 1955   Date of Service: 2/27/23    CC: Full Skin Exam    HPI: Felicia Meza is a 67 y.o. female presents today for a full skin exam.  Patient was last seen 10/22 and dermatologic history includes BCC and Aks . Patient is concerned today about a lesion located on the face.  It has been present for 4 month(s). It has not been treated in the past.      Past Medical History:   Diagnosis Date    Arthritis      Past Surgical History:   Procedure Laterality Date    HYSTEROSCOPY WITH HYDROTHERMAL ABLATION OF ENDOMETRIUM WITH DILATION AND CURETTAGE N/A 1/14/2022    Procedure: HYSTEROSCOPY, WITH DILATION AND CURETTAGE OF UTERUS AND ENDOMETRIAL ABLATION, MYOSURE;  Surgeon: Jazmyne Patrick DO;  Location: [facility];  Service: OB/GYN;  Laterality: N/A;    SKIN CANCER EXCISION      TONSILLECTOMY, ADENOIDECTOMY      TUBAL LIGATION       Review of patient's allergies indicates:   Allergen Reactions    Bactrim [sulfamethoxazole-trimethoprim] Other (See Comments)     Swelling and whelps     Penicillins Rash and Blisters       Current Outpatient Medications:     calcium carbonate (OS-SIDDHARTH) 500 mg calcium (1,250 mg) tablet, 1 tablet with meals, Disp: , Rfl:     clobetasoL (TEMOVATE) 0.05 % cream, Apply to affected area twice daily, tapering with improvement, Disp: 60 g, Rfl: 2    gabapentin (NEURONTIN) 100 MG capsule, 1 capsule., Disp: , Rfl:     ibuprofen (ADVIL,MOTRIN) 800 MG tablet, every 4 (four) hours as needed., Disp: , Rfl:     NP THYROID 30 mg Tab, , Disp: , Rfl:     oxyCODONE-acetaminophen (PERCOCET) 5-325 mg per tablet, , Disp: , Rfl:     progesterone (PROMETRIUM) 200 MG capsule, , Disp: , Rfl:     SKYRIZI 150 mg/mL PnIj, Inject into the skin., Disp: , Rfl:     traZODone (DESYREL) 100 MG tablet, Take by mouth., Disp: , Rfl:     TREMFYA 100 mg/mL AtIn, , Disp: , Rfl:     ROS: A focused review of systems was obtained and  negative.     Exam: A full skin exam was performed including scalp, hair, face, neck, chest, back, abdomen, right arm, left arm, right hand, left hand, nails, right leg, and left leg.  All areas examined were normal expect as per below in assessment and plan.  General Appearance of the patient is well developed and well nourished.  Orientation: alert and oriented x 3.  Mood and affect: pleasant.    Assessment:   The primary encounter diagnosis was Other skin changes due to chronic exposure to nonionizing radiation. Diagnoses of SK (seborrheic keratosis), Psoriasis, Psoriatic arthritis, and History of nonmelanoma skin cancer were also pertinent to this visit.    Plan:      Seborrheic Keratosis (L82.1)  - Stuck-on, warty, greasy brown papule with pseudo-horn cysts scattered on the trunk and extremities    Plan: Counseling.  I counseled the patient regarding the following:  Skin Care: Seborrheic Keratoses are benign. No treatment is necessary.  Expectations: Seborrheic Keratoses are benign warty growths. Patients get more of them as they age    Plan: Reassurance      Plaque Psoriasis  - psoriasiform plaques with micaceous scale  Status: Inadequately controlled     Plan: Counseling  I counseled the patient regarding the following:  Skin care: Emollients, ambient sun exposure, shampoos with tar, selenium or zinc pyrithione can improve psoriasis.  Expectations: Psoriasis is chronic in nature with periods of remissions and flares. Flares can be triggered by stress, infections (group A strep), certain medications and alcohol.  Contact office if: Psoriasis worsens, or fails to improve despite several months of treatment.    Psoriatic Arthritis  - Psoriasiform plaques with micaceous scale  Status: Inadequately Controlled    Plan: Counseling.  I counseled the patient regarding the following:  Instructions: Systemic medications are the treatment of choice for psoriatic arthritis. Treatment options include  anti-TNF therapy and  methotrexate.  Expectations: Psoriatic arthritis is a type of inflammatory arthritis which occurs in conjunction with psoriasis.  Approximately 5% of psoriasis patients develop psoriatic arthritis. Psoriatic arthritis is chronic in nature with  periods of remissions and flares. Flares can be triggered by stress, infections (group A strep), certain medications  and alcohol. Psoriatic arthritis requires systemic medication for adequate treatment.  Contact office if: Your psoriatic arthritis worsens, or fails to improve despite several months of treatment.    - Pt unable to obtain Skyrizi r/t high copay. Currently applying for pt assistance program. Samples given today. Instructed to call the clinic if not covered in 4 weeks  - will refer to Dr. Hdz as Dr. Perez is leaving     History of non-melanoma skin cancer (Z85.828)  - Well healed scar with NER  Associated diagnosis: Medical surveillance following completed treatment    Plan: Monitoring.    Other Skin Changes Due to Chronic Exposure of Nonionizing Radiation (L57.8)    Plan: Monitoring.     Plan: Sunscreen Recommendations.  I recommended a broad spectrum sunscreen with a SPF of 30 or higher. I explained that SPF 30 sunscreens block approximately 97 percent of the  sun's harmful rays. Sunscreens should be applied at least 15 minutes prior to expected sun exposure and then every 2 hours after that as long as  sun exposure continues. If swimming or exercising sunscreen should be reapplied every 45 minutes to an hour after getting wet or sweating. One  ounce, or the equivalent of a shot glass full of sunscreen, is adequate to protect the skin not covered by a bathing suit. I also recommended a lip  balm with a sunscreen as well. Sun protective clothing can be used in lieu of sunscreen but must be worn the entire time you are exposed to the  sun's rays.      Follow up in about 6 months (around 8/27/2023) for FSE.    Socorro Blanc MD

## 2023-05-12 DIAGNOSIS — L40.9 PSORIASIS: ICD-10-CM

## 2023-09-28 ENCOUNTER — OFFICE VISIT (OUTPATIENT)
Dept: DERMATOLOGY | Facility: CLINIC | Age: 68
End: 2023-09-28
Payer: MEDICARE

## 2023-09-28 DIAGNOSIS — L40.9 PSORIASIS: ICD-10-CM

## 2023-09-28 DIAGNOSIS — Z79.899 HIGH RISK MEDICATION USE: ICD-10-CM

## 2023-09-28 DIAGNOSIS — L82.1 SEBORRHEIC KERATOSES: ICD-10-CM

## 2023-09-28 DIAGNOSIS — L57.8 OTHER SKIN CHANGES DUE TO CHRONIC EXPOSURE TO NONIONIZING RADIATION: Primary | ICD-10-CM

## 2023-09-28 DIAGNOSIS — L40.50 PSORIATIC ARTHRITIS: ICD-10-CM

## 2023-09-28 PROCEDURE — 99214 OFFICE O/P EST MOD 30 MIN: CPT | Mod: ,,, | Performed by: DERMATOLOGY

## 2023-09-28 PROCEDURE — 99214 PR OFFICE/OUTPT VISIT, EST, LEVL IV, 30-39 MIN: ICD-10-PCS | Mod: ,,, | Performed by: DERMATOLOGY

## 2023-09-28 RX ORDER — CLOBETASOL PROPIONATE 0.5 MG/G
CREAM TOPICAL
Qty: 60 G | Refills: 2 | Status: SHIPPED | OUTPATIENT
Start: 2023-09-28

## 2023-09-28 NOTE — PROGRESS NOTES
[facility]   Socorro Blanc MD    Patient Name: Felicia Meza  Patient YOB: 1955   Date of Service: 9/28/23    CC: Full Skin Exam    HPI: Felicia Meza is a 67 y.o. female presents today for a full skin exam.  Patient was last seen 02/27/2023 and dermatologic history includes BCC and AKs. Patient is concerned today about a lesion located on the left cheek.  It has been present for 4 week(s). It has not been treated in the past.      Past Surgical History:   Procedure Laterality Date    HYSTEROSCOPY WITH HYDROTHERMAL ABLATION OF ENDOMETRIUM WITH DILATION AND CURETTAGE N/A 1/14/2022    Procedure: HYSTEROSCOPY, WITH DILATION AND CURETTAGE OF UTERUS AND ENDOMETRIAL ABLATION, MYOSURE;  Surgeon: Jazmyne Patrick DO;  Location: [facility];  Service: OB/GYN;  Laterality: N/A;    SKIN CANCER EXCISION      TONSILLECTOMY, ADENOIDECTOMY      TUBAL LIGATION       Review of patient's allergies indicates:   Allergen Reactions    Bactrim [sulfamethoxazole-trimethoprim] Other (See Comments)     Swelling and whelps     Penicillins Rash and Blisters       Current Outpatient Medications:     calcium carbonate (OS-SIDDHARTH) 500 mg calcium (1,250 mg) tablet, 1 tablet with meals, Disp: , Rfl:     clobetasoL (TEMOVATE) 0.05 % cream, Apply to affected area twice daily, tapering with improvement, Disp: 60 g, Rfl: 2    gabapentin (NEURONTIN) 100 MG capsule, 1 capsule., Disp: , Rfl:     ibuprofen (ADVIL,MOTRIN) 800 MG tablet, every 4 (four) hours as needed., Disp: , Rfl:     NP THYROID 30 mg Tab, , Disp: , Rfl:     oxyCODONE-acetaminophen (PERCOCET) 5-325 mg per tablet, , Disp: , Rfl:     progesterone (PROMETRIUM) 200 MG capsule, , Disp: , Rfl:     SKYRIZI 150 mg/mL PnIj, Inject into the skin., Disp: , Rfl:     traZODone (DESYREL) 100 MG tablet, Take by mouth., Disp: , Rfl:     TREMFYA 100 mg/mL AtIn, , Disp: , Rfl:     ROS: A focused review of systems was obtained and negative.     Exam: A full skin exam was performed  including scalp, hair, face, neck, chest, back, abdomen, right arm, left arm, right hand, left hand, nails, right leg, and left leg.  All areas examined were normal expect as per below in assessment and plan.  General Appearance of the patient is well developed and well nourished.  Orientation: alert and oriented x 3.  Mood and affect: pleasant.    Assessment:   The primary encounter diagnosis was Other skin changes due to chronic exposure to nonionizing radiation. Diagnoses of Psoriasis, Psoriatic arthritis, High risk medication use, and Seborrheic keratoses were also pertinent to this visit.    Plan:   Other Skin Changes Due to Chronic Exposure of Nonionizing Radiation (L57.8)    Plan: Monitoring.     Plan: Sunscreen Recommendations.  I recommended a broad spectrum sunscreen with a SPF of 30 or higher. I explained that SPF 30 sunscreens block approximately 97 percent of the  sun's harmful rays. Sunscreens should be applied at least 15 minutes prior to expected sun exposure and then every 2 hours after that as long as  sun exposure continues. If swimming or exercising sunscreen should be reapplied every 45 minutes to an hour after getting wet or sweating. One  ounce, or the equivalent of a shot glass full of sunscreen, is adequate to protect the skin not covered by a bathing suit. I also recommended a lip  balm with a sunscreen as well. Sun protective clothing can be used in lieu of sunscreen but must be worn the entire time you are exposed to the  sun's rays.    Seborrheic Keratosis (L82.1)  - Stuck-on, warty, greasy brown papule with pseudo-horn cysts scattered on the trunk and extremities    Plan: Counseling.  I counseled the patient regarding the following:  Skin Care: Seborrheic Keratoses are benign. No treatment is necessary.  Expectations: Seborrheic Keratoses are benign warty growths. Patients get more of them as they age    Plan: Reassurance    Plaque Psoriasis  - psoriasiform plaques with micaceous  scale  Status: Inadequately controlled     Plan: Counseling  I counseled the patient regarding the following:  Skin care: Emollients, ambient sun exposure, shampoos with tar, selenium or zinc pyrithione can improve psoriasis.  Expectations: Psoriasis is chronic in nature with periods of remissions and flares. Flares can be triggered by stress, infections (group A strep), certain medications and alcohol.  Contact office if: Psoriasis worsens, or fails to improve despite several months of treatment.    - failed humira and MTX  - will try to restart tremfya    Psoriatic Arthritis  - Psoriasiform plaques with micaceous scale  Status: Inadequately controlled     Plan: Counseling.  I counseled the patient regarding the following:  Instructions: Systemic medications are the treatment of choice for psoriatic arthritis. Treatment options include  anti-TNF therapy and methotrexate.  Expectations: Psoriatic arthritis is a type of inflammatory arthritis which occurs in conjunction with psoriasis.  Approximately 5% of psoriasis patients develop psoriatic arthritis. Psoriatic arthritis is chronic in nature with  periods of remissions and flares. Flares can be triggered by stress, infections (group A strep), certain medications  and alcohol. Psoriatic arthritis requires systemic medication for adequate treatment.  Contact office if: Your psoriatic arthritis worsens, or fails to improve despite several months of treatment.    - plan per above    High Risk Medication Monitoring (Z79.899) : The risks and benefits of the medication were reviewed in full with the patient. Should any side effects occur, the patient will stop the medication and contact me immediately.    I discussed with the patient the risks of Tremfya including but not limited to immunosuppression, serious infections, and drug reactions. The patient understands that monitoring is required including a PPD at baseline and must alert us or the primary physician if  symptoms of infection or other concerning signs are noted.    Quant gold negative 5/2023      Medications Ordered This Encounter   Medications    clobetasoL (TEMOVATE) 0.05 % cream     Sig: Apply to affected area twice daily, tapering with improvement     Dispense:  60 g     Refill:  2         Follow up in about 6 months (around 3/28/2024).    Socorro Blanc MD

## 2024-02-27 ENCOUNTER — OFFICE VISIT (OUTPATIENT)
Dept: DERMATOLOGY | Facility: CLINIC | Age: 69
End: 2024-02-27
Payer: MEDICARE

## 2024-02-27 DIAGNOSIS — L40.9 PSORIASIS: ICD-10-CM

## 2024-02-27 DIAGNOSIS — Z85.828 HISTORY OF BASAL CELL CARCINOMA OF SKIN: ICD-10-CM

## 2024-02-27 DIAGNOSIS — L40.50 PSORIATIC ARTHRITIS: ICD-10-CM

## 2024-02-27 DIAGNOSIS — D48.9 NEOPLASM OF UNCERTAIN BEHAVIOR: ICD-10-CM

## 2024-02-27 DIAGNOSIS — L82.1 SEBORRHEIC KERATOSES: ICD-10-CM

## 2024-02-27 DIAGNOSIS — Z79.899 HIGH RISK MEDICATION USE: ICD-10-CM

## 2024-02-27 DIAGNOSIS — L57.8 OTHER SKIN CHANGES DUE TO CHRONIC EXPOSURE TO NONIONIZING RADIATION: Primary | ICD-10-CM

## 2024-02-27 DIAGNOSIS — L57.0 ACTINIC KERATOSES: ICD-10-CM

## 2024-02-27 PROCEDURE — 11102 TANGNTL BX SKIN SINGLE LES: CPT | Mod: ,,, | Performed by: DERMATOLOGY

## 2024-02-27 PROCEDURE — 88304 TISSUE EXAM BY PATHOLOGIST: CPT | Mod: TC,SUR | Performed by: DERMATOLOGY

## 2024-02-27 PROCEDURE — 99214 OFFICE O/P EST MOD 30 MIN: CPT | Mod: 25,,, | Performed by: DERMATOLOGY

## 2024-02-27 PROCEDURE — 88304 TISSUE EXAM BY PATHOLOGIST: CPT | Mod: 26,,, | Performed by: PATHOLOGY

## 2024-02-27 PROCEDURE — 17000 DESTRUCT PREMALG LESION: CPT | Mod: XS,,, | Performed by: DERMATOLOGY

## 2024-02-27 PROCEDURE — 17003 DESTRUCT PREMALG LES 2-14: CPT | Mod: ,,, | Performed by: DERMATOLOGY

## 2024-02-27 NOTE — PROGRESS NOTES
[facility]   Socorro Blanc MD    Patient Name: Felicia Meza  Patient YOB: 1955   Date of Service: 2/27/24    CC: Full Skin Exam    HPI: Felicia Meza is a 68 y.o. female presents today for a full skin exam.  Patient was last seen 09/23 and dermatologic history includes NMSC and AKs. Patient is also following up for Psoriasis     Past Medical History:   Diagnosis Date    Arthritis      Past Surgical History:   Procedure Laterality Date    HYSTEROSCOPY WITH HYDROTHERMAL ABLATION OF ENDOMETRIUM WITH DILATION AND CURETTAGE N/A 1/14/2022    Procedure: HYSTEROSCOPY, WITH DILATION AND CURETTAGE OF UTERUS AND ENDOMETRIAL ABLATION, MYOSURE;  Surgeon: Jazmyne Patrick DO;  Location: [facility], [place];  Service: OB/GYN;  Laterality: N/A;    SKIN CANCER EXCISION      TONSILLECTOMY, ADENOIDECTOMY      TUBAL LIGATION       Review of patient's allergies indicates:   Allergen Reactions    Bactrim [sulfamethoxazole-trimethoprim] Other (See Comments)     Swelling and whelps     Penicillins Rash and Blisters       Current Outpatient Medications:     calcium carbonate (OS-SIDDHARTH) 500 mg calcium (1,250 mg) tablet, 1 tablet with meals, Disp: , Rfl:     clobetasoL (TEMOVATE) 0.05 % cream, Apply to affected area twice daily, tapering with improvement, Disp: 60 g, Rfl: 2    gabapentin (NEURONTIN) 100 MG capsule, 1 capsule., Disp: , Rfl:     ibuprofen (ADVIL,MOTRIN) 800 MG tablet, every 4 (four) hours as needed., Disp: , Rfl:     NP THYROID 30 mg Tab, , Disp: , Rfl:     oxyCODONE-acetaminophen (PERCOCET) 5-325 mg per tablet, , Disp: , Rfl:     progesterone (PROMETRIUM) 200 MG capsule, , Disp: , Rfl:     SKYRIZI 150 mg/mL PnIj, Inject into the skin., Disp: , Rfl:     traZODone (DESYREL) 100 MG tablet, Take by mouth., Disp: , Rfl:     TREMFYA 100 mg/mL AtIn, , Disp: , Rfl:     ROS: A focused review of systems was obtained and negative.     Exam: A full skin exam was performed including scalp, hair, face, neck, chest, back,  abdomen, right arm, left arm, right hand, left hand, nails, right leg, and left leg.  All areas examined were normal expect as per below in assessment and plan.  General Appearance of the patient is well developed and well nourished.  Orientation: alert and oriented x 3.  Mood and affect: pleasant.    Assessment:   The primary encounter diagnosis was Other skin changes due to chronic exposure to nonionizing radiation. Diagnoses of Psoriasis, Psoriatic arthritis, High risk medication use, Seborrheic keratoses, History of basal cell carcinoma of skin, Neoplasm of uncertain behavior, and Actinic keratoses were also pertinent to this visit.    Plan:        Seborrheic Keratosis (L82.1)  - Stuck-on, warty, greasy brown papule with pseudo-horn cysts scattered on the trunk and extremities    Plan: Counseling.  I counseled the patient regarding the following:  Skin Care: Seborrheic Keratoses are benign. No treatment is necessary.  Expectations: Seborrheic Keratoses are benign warty growths. Patients get more of them as they age    Plan: Reassurance    Plaque Psoriasis  - psoriasiform plaques with micaceous scale  Status: Inadequately controlled  BSA 15%    Plan: Counseling  I counseled the patient regarding the following:  Skin care: Emollients, ambient sun exposure, shampoos with tar, selenium or zinc pyrithione can improve psoriasis.  Expectations: Psoriasis is chronic in nature with periods of remissions and flares. Flares can be triggered by stress, infections (group A strep), certain medications and alcohol.  Contact office if: Psoriasis worsens, or fails to improve despite several months of treatment.    - approved for tremfya but high copay  - failed MTX and humira   - will pursue remicaid infusions     Infliximab Counseling: I discussed with the patient the risks of infliximab including but not limited to myelosuppression, immunosuppression, autoimmune hepatitis, demyelinating diseases, lymphoma, and serious  infections. The patient understands that monitoring is required including a PPD at baseline and must alert us or the primary physician if symptoms of infection or other concerning signs are noted.    Plaque Psoriasis  - psoriasiform plaques with micaceous scale  Status: Inadequately controlled     Plan: Counseling  I counseled the patient regarding the following:  Skin care: Emollients, ambient sun exposure, shampoos with tar, selenium or zinc pyrithione can improve psoriasis.  Expectations: Psoriasis is chronic in nature with periods of remissions and flares. Flares can be triggered by stress, infections (group A strep), certain medications and alcohol.  Contact office if: Psoriasis worsens, or fails to improve despite several months of treatment.    - plan per above    Actinic Keratoses(L57.0)  - Erythematous patches and papules with hyperkeratotic scale distributed on the right dorsal hand and left cheek.    Plan: Counseling.  I counseled the patient regarding the following:  Skin Care: Sun protective clothing and broad spectrum sunscreen can prevent the formation of Actinic  Keratoses. AKs can resolve with cryotherapy, photodynamic therapy, imiquimod, topical 5-FU.  Expectations: Actinic Keratoses are precancerous proliferations that occur within sun damaged skin. If untreated,  a small subset of AKs can develop into Squamous Cell Carcinoma.  Contact Office if: If AKs fail to resolve despite treatment, or if you develop a side effect from therapy, such as  unbearable crusting, scabbing, redness and tenderness.    Plan: Liquid Nitrogen.  A total of 2 lesions were treated with liquid nitrogen for 2 freeze-thaw cycles lasting 5 seconds, located on the above locations.   The patient's consent was obtained including but not limited to risks of crusting, scabbing,  blistering, scarring, darker or lighter pigmentary change, recurrence, incomplete removal and infection.    Neoplasm of Uncertain Behavior (D48.5)  -  Pearly papule located on the left malar cheek  Ddx includes: Milia vs BCC    Plan: Counseling.  I counseled the patient regarding the following:  Instructions: Neoplasms of Uncertain Behavior can be observed, biopsied or surgically removed depending on the  level of clinical suspicion.  Instructions: Neoplasms of Uncertain Behavior can be observed, biopsied or surgically removed depending on the  level of clinical suspicion.  Contact Office if: patient develops any new lesions that fail to heal, ulcerate or bleed.    Plan: Biopsy by Shave Method.  Location (1): Left malar cheek  Written consent was obtained and risks were reviewed including but not  limited to scarring, infection, bleeding, scabbing, incomplete removal, nerve damage and allergy to anesthesia.  The area was prepped with Chloraprep. Local anesthesia was obtained with approximately 0.5cc of 1% lidocaine  with epinephrine. A biopsy by shave method to the level of the dermis (sent for H and E) was performed using  a Dermablade on the above location. Aluminum chloride was used for hemostasis. Following the biopsy  Petrolatum and a bandage were applied. Patient will be notified of biopsy results. However, patient instructed to  call the office if not contacted within 2 weeks.      History of non-melanoma skin cancer (Z85.828)  - Well healed scar with NER  Associated diagnosis: Medical surveillance following completed treatment    Plan: Monitoring.      Other Skin Changes Due to Chronic Exposure of Nonionizing Radiation (L57.8)    Plan: Monitoring.     Plan: Sunscreen Recommendations.  I recommended a broad spectrum sunscreen with a SPF of 30 or higher. I explained that SPF 30 sunscreens block approximately 97 percent of the  sun's harmful rays. Sunscreens should be applied at least 15 minutes prior to expected sun exposure and then every 2 hours after that as long as  sun exposure continues. If swimming or exercising sunscreen should be reapplied every 45  minutes to an hour after getting wet or sweating. One  ounce, or the equivalent of a shot glass full of sunscreen, is adequate to protect the skin not covered by a bathing suit. I also recommended a lip  balm with a sunscreen as well. Sun protective clothing can be used in lieu of sunscreen but must be worn the entire time you are exposed to the  sun's rays.      Follow up in about 6 months (around 8/27/2024).    Socorro Blanc MD

## 2024-02-27 NOTE — PROGRESS NOTES
[facility]   Socorro Blanc MD    Patient Name: Felicia Meza  Patient YOB: 1955   Date of Service: 2/27/24    CC: Follow-up Psoriasis    HPI: Felicia Meza is a 68 y.o. female here today for follow-up of psoriasis, last seen 09/23.  Previous treatments include ***.  Overall, the *** is {IMPROVED/STABLE/WORSE:05197}.  Treatment plan {WAS/WAS NOT:76144} followed as directed.    Past Medical History:   Diagnosis Date    Arthritis      Past Surgical History:   Procedure Laterality Date    HYSTEROSCOPY WITH HYDROTHERMAL ABLATION OF ENDOMETRIUM WITH DILATION AND CURETTAGE N/A 1/14/2022    Procedure: HYSTEROSCOPY, WITH DILATION AND CURETTAGE OF UTERUS AND ENDOMETRIAL ABLATION, MYOSURE;  Surgeon: Jazmyne Patrick DO;  Location: [facility];  Service: OB/GYN;  Laterality: N/A;    SKIN CANCER EXCISION      TONSILLECTOMY, ADENOIDECTOMY      TUBAL LIGATION       Review of patient's allergies indicates:   Allergen Reactions    Bactrim [sulfamethoxazole-trimethoprim] Other (See Comments)     Swelling and whelps     Penicillins Rash and Blisters       Current Outpatient Medications:     calcium carbonate (OS-SIDDHARTH) 500 mg calcium (1,250 mg) tablet, 1 tablet with meals, Disp: , Rfl:     clobetasoL (TEMOVATE) 0.05 % cream, Apply to affected area twice daily, tapering with improvement, Disp: 60 g, Rfl: 2    gabapentin (NEURONTIN) 100 MG capsule, 1 capsule., Disp: , Rfl:     ibuprofen (ADVIL,MOTRIN) 800 MG tablet, every 4 (four) hours as needed., Disp: , Rfl:     NP THYROID 30 mg Tab, , Disp: , Rfl:     oxyCODONE-acetaminophen (PERCOCET) 5-325 mg per tablet, , Disp: , Rfl:     progesterone (PROMETRIUM) 200 MG capsule, , Disp: , Rfl:     SKYRIZI 150 mg/mL PnIj, Inject into the skin., Disp: , Rfl:     traZODone (DESYREL) 100 MG tablet, Take by mouth., Disp: , Rfl:     TREMFYA 100 mg/mL AtIn, , Disp: , Rfl:     ROS: A focused review of systems was obtained and negative.     Exam: A focused skin exam was  performed. All areas examined were normal except as mentioned in the assessment and plan below.  General Appearance of the patient is well developed and well nourished.  Orientation: alert and oriented x 3.  Mood and affect: pleasant.    Assessment:   The primary encounter diagnosis was Other skin changes due to chronic exposure to nonionizing radiation. Diagnoses of Psoriasis, Psoriatic arthritis, High risk medication use, Seborrheic keratoses, and History of basal cell carcinoma of skin were also pertinent to this visit.    Plan:            No follow-ups on file.    Socorro Blanc MD

## 2024-02-27 NOTE — Clinical Note
We are starting Ms. Meza on remicade for psoriasis and psoriatic arthritis.  She has failed MTX and humira.  Hopefully pre-cert won't have an issue getting it approved.  Thanks!

## 2024-02-29 PROBLEM — L40.9 PSORIASIS: Status: ACTIVE | Noted: 2024-02-29

## 2024-02-29 PROBLEM — L40.50 PSORIATIC ARTHRITIS: Status: ACTIVE | Noted: 2024-02-29

## 2024-02-29 LAB
ESTROGEN SERPL-MCNC: NORMAL PG/ML
INSULIN SERPL-ACNC: NORMAL U[IU]/ML
LAB AP GROSS DESCRIPTION: NORMAL
LAB AP LABORATORY NOTES: NORMAL
LAB AP SPEC A DDX: NORMAL
LAB AP SPEC A MORPHOLOGY: NORMAL
LAB AP SPEC A PROCEDURE: NORMAL
T3RU NFR SERPL: NORMAL %

## 2024-02-29 RX ORDER — SODIUM CHLORIDE 0.9 % (FLUSH) 0.9 %
10 SYRINGE (ML) INJECTION
Status: CANCELLED | OUTPATIENT
Start: 2024-03-14

## 2024-02-29 RX ORDER — DIPHENHYDRAMINE HYDROCHLORIDE 50 MG/ML
50 INJECTION INTRAMUSCULAR; INTRAVENOUS ONCE AS NEEDED
Status: CANCELLED | OUTPATIENT
Start: 2024-03-14

## 2024-02-29 RX ORDER — ACETAMINOPHEN 325 MG/1
650 TABLET ORAL
Status: CANCELLED | OUTPATIENT
Start: 2024-03-14

## 2024-02-29 RX ORDER — HEPARIN 100 UNIT/ML
500 SYRINGE INTRAVENOUS
Status: CANCELLED | OUTPATIENT
Start: 2024-03-14

## 2024-02-29 RX ORDER — EPINEPHRINE 0.3 MG/.3ML
0.3 INJECTION SUBCUTANEOUS ONCE AS NEEDED
Status: CANCELLED | OUTPATIENT
Start: 2024-03-14

## 2024-02-29 RX ORDER — CETIRIZINE HYDROCHLORIDE 10 MG/1
10 TABLET ORAL
Status: CANCELLED | OUTPATIENT
Start: 2024-03-14

## 2024-02-29 RX ORDER — IPRATROPIUM BROMIDE AND ALBUTEROL SULFATE 2.5; .5 MG/3ML; MG/3ML
3 SOLUTION RESPIRATORY (INHALATION)
Status: CANCELLED | OUTPATIENT
Start: 2024-03-14

## 2024-03-18 ENCOUNTER — INFUSION (OUTPATIENT)
Dept: INFUSION THERAPY | Facility: HOSPITAL | Age: 69
End: 2024-03-18
Attending: DERMATOLOGY
Payer: MEDICARE

## 2024-03-18 VITALS
OXYGEN SATURATION: 95 % | RESPIRATION RATE: 17 BRPM | TEMPERATURE: 98 F | SYSTOLIC BLOOD PRESSURE: 153 MMHG | BODY MASS INDEX: 27.64 KG/M2 | WEIGHT: 161 LBS | DIASTOLIC BLOOD PRESSURE: 87 MMHG | HEART RATE: 75 BPM

## 2024-03-18 DIAGNOSIS — L40.9 PSORIASIS: Primary | ICD-10-CM

## 2024-03-18 DIAGNOSIS — L40.50 PSORIATIC ARTHRITIS: ICD-10-CM

## 2024-03-18 PROCEDURE — 96413 CHEMO IV INFUSION 1 HR: CPT

## 2024-03-18 PROCEDURE — 25000003 PHARM REV CODE 250: Performed by: DERMATOLOGY

## 2024-03-18 PROCEDURE — 96415 CHEMO IV INFUSION ADDL HR: CPT

## 2024-03-18 PROCEDURE — 63600175 PHARM REV CODE 636 W HCPCS: Mod: JZ,JG | Performed by: DERMATOLOGY

## 2024-03-18 RX ORDER — ACETAMINOPHEN 325 MG/1
650 TABLET ORAL
Status: CANCELLED | OUTPATIENT
Start: 2024-05-13

## 2024-03-18 RX ORDER — HEPARIN 100 UNIT/ML
500 SYRINGE INTRAVENOUS
Status: CANCELLED | OUTPATIENT
Start: 2024-05-13

## 2024-03-18 RX ORDER — EPINEPHRINE 0.3 MG/.3ML
0.3 INJECTION SUBCUTANEOUS ONCE AS NEEDED
Status: DISCONTINUED | OUTPATIENT
Start: 2024-03-18 | End: 2024-03-18 | Stop reason: HOSPADM

## 2024-03-18 RX ORDER — DIPHENHYDRAMINE HYDROCHLORIDE 50 MG/ML
50 INJECTION INTRAMUSCULAR; INTRAVENOUS ONCE AS NEEDED
Status: DISCONTINUED | OUTPATIENT
Start: 2024-03-18 | End: 2024-03-18 | Stop reason: HOSPADM

## 2024-03-18 RX ORDER — SODIUM CHLORIDE 0.9 % (FLUSH) 0.9 %
10 SYRINGE (ML) INJECTION
Status: CANCELLED | OUTPATIENT
Start: 2024-05-13

## 2024-03-18 RX ORDER — SODIUM CHLORIDE 0.9 % (FLUSH) 0.9 %
10 SYRINGE (ML) INJECTION
Status: DISCONTINUED | OUTPATIENT
Start: 2024-03-18 | End: 2024-03-18 | Stop reason: HOSPADM

## 2024-03-18 RX ORDER — EPINEPHRINE 0.3 MG/.3ML
0.3 INJECTION SUBCUTANEOUS ONCE AS NEEDED
Status: CANCELLED | OUTPATIENT
Start: 2024-05-13

## 2024-03-18 RX ORDER — DIPHENHYDRAMINE HYDROCHLORIDE 50 MG/ML
50 INJECTION INTRAMUSCULAR; INTRAVENOUS ONCE AS NEEDED
Status: CANCELLED | OUTPATIENT
Start: 2024-05-13

## 2024-03-18 RX ORDER — CETIRIZINE HYDROCHLORIDE 10 MG/1
10 TABLET ORAL
Status: DISCONTINUED | OUTPATIENT
Start: 2024-03-18 | End: 2024-03-18 | Stop reason: HOSPADM

## 2024-03-18 RX ORDER — ACETAMINOPHEN 325 MG/1
650 TABLET ORAL
Status: DISCONTINUED | OUTPATIENT
Start: 2024-03-18 | End: 2024-03-18 | Stop reason: HOSPADM

## 2024-03-18 RX ORDER — CETIRIZINE HYDROCHLORIDE 10 MG/1
10 TABLET ORAL
Status: CANCELLED | OUTPATIENT
Start: 2024-05-13

## 2024-03-18 RX ORDER — HEPARIN SODIUM (PORCINE) LOCK FLUSH IV SOLN 100 UNIT/ML 100 UNIT/ML
500 SOLUTION INTRAVENOUS
Status: DISCONTINUED | OUTPATIENT
Start: 2024-03-18 | End: 2024-03-18 | Stop reason: HOSPADM

## 2024-03-18 RX ORDER — IPRATROPIUM BROMIDE AND ALBUTEROL SULFATE 2.5; .5 MG/3ML; MG/3ML
3 SOLUTION RESPIRATORY (INHALATION)
Status: CANCELLED | OUTPATIENT
Start: 2024-05-13

## 2024-03-18 RX ADMIN — SODIUM CHLORIDE 350 MG: 9 INJECTION, SOLUTION INTRAVENOUS at 08:03

## 2024-03-18 NOTE — PROGRESS NOTES
0755 Pt here for inflectra infusion, resting in recliner, TP released and pharmacy notified    Pt here for first infusion, discussed signs and symptoms to watch for and talked about possible side effects, pt verbalized understanding  0816 Inflectra infusion started to at 10ml/hr and will increase as tolerated per directions with a filter applied  1020 Inflectra infusion complete, tolerated well, will continue to monitor  1050 pt discharged ambulatory with no adverse reaction noted, pt notified to go to ER with any adverse reactions, AVS given along with medication information  Follow up appt made 2 weeks

## 2024-04-01 ENCOUNTER — INFUSION (OUTPATIENT)
Dept: INFUSION THERAPY | Facility: HOSPITAL | Age: 69
End: 2024-04-01
Attending: DERMATOLOGY
Payer: MEDICARE

## 2024-04-01 VITALS
DIASTOLIC BLOOD PRESSURE: 83 MMHG | HEART RATE: 74 BPM | SYSTOLIC BLOOD PRESSURE: 150 MMHG | WEIGHT: 161 LBS | TEMPERATURE: 99 F | OXYGEN SATURATION: 98 % | RESPIRATION RATE: 17 BRPM | BODY MASS INDEX: 27.64 KG/M2

## 2024-04-01 DIAGNOSIS — L40.50 PSORIATIC ARTHRITIS: ICD-10-CM

## 2024-04-01 DIAGNOSIS — L40.9 PSORIASIS: Primary | ICD-10-CM

## 2024-04-01 PROCEDURE — 96413 CHEMO IV INFUSION 1 HR: CPT

## 2024-04-01 PROCEDURE — 25000003 PHARM REV CODE 250: Performed by: DERMATOLOGY

## 2024-04-01 PROCEDURE — 96415 CHEMO IV INFUSION ADDL HR: CPT

## 2024-04-01 PROCEDURE — 63600175 PHARM REV CODE 636 W HCPCS: Mod: JZ,JG | Performed by: DERMATOLOGY

## 2024-04-01 RX ORDER — EPINEPHRINE 0.3 MG/.3ML
0.3 INJECTION SUBCUTANEOUS ONCE AS NEEDED
Status: CANCELLED | OUTPATIENT
Start: 2024-05-13

## 2024-04-01 RX ORDER — SODIUM CHLORIDE 0.9 % (FLUSH) 0.9 %
10 SYRINGE (ML) INJECTION
Status: DISCONTINUED | OUTPATIENT
Start: 2024-04-01 | End: 2024-04-01 | Stop reason: HOSPADM

## 2024-04-01 RX ORDER — IPRATROPIUM BROMIDE AND ALBUTEROL SULFATE 2.5; .5 MG/3ML; MG/3ML
3 SOLUTION RESPIRATORY (INHALATION)
Status: CANCELLED | OUTPATIENT
Start: 2024-05-13

## 2024-04-01 RX ORDER — ACETAMINOPHEN 325 MG/1
650 TABLET ORAL
Status: CANCELLED | OUTPATIENT
Start: 2024-05-13

## 2024-04-01 RX ORDER — SODIUM CHLORIDE 0.9 % (FLUSH) 0.9 %
10 SYRINGE (ML) INJECTION
Status: CANCELLED | OUTPATIENT
Start: 2024-05-13

## 2024-04-01 RX ORDER — CETIRIZINE HYDROCHLORIDE 10 MG/1
10 TABLET ORAL
Status: CANCELLED | OUTPATIENT
Start: 2024-05-13

## 2024-04-01 RX ORDER — HEPARIN 100 UNIT/ML
500 SYRINGE INTRAVENOUS
Status: CANCELLED | OUTPATIENT
Start: 2024-05-13

## 2024-04-01 RX ORDER — DIPHENHYDRAMINE HYDROCHLORIDE 50 MG/ML
50 INJECTION INTRAMUSCULAR; INTRAVENOUS ONCE AS NEEDED
Status: DISCONTINUED | OUTPATIENT
Start: 2024-04-01 | End: 2024-04-01 | Stop reason: HOSPADM

## 2024-04-01 RX ORDER — CETIRIZINE HYDROCHLORIDE 10 MG/1
10 TABLET ORAL
Status: DISCONTINUED | OUTPATIENT
Start: 2024-04-01 | End: 2024-04-01 | Stop reason: HOSPADM

## 2024-04-01 RX ORDER — ACETAMINOPHEN 325 MG/1
650 TABLET ORAL
Status: DISCONTINUED | OUTPATIENT
Start: 2024-04-01 | End: 2024-04-01 | Stop reason: HOSPADM

## 2024-04-01 RX ORDER — EPINEPHRINE 0.3 MG/.3ML
0.3 INJECTION SUBCUTANEOUS ONCE AS NEEDED
Status: DISCONTINUED | OUTPATIENT
Start: 2024-04-01 | End: 2024-04-01 | Stop reason: HOSPADM

## 2024-04-01 RX ORDER — DIPHENHYDRAMINE HYDROCHLORIDE 50 MG/ML
50 INJECTION INTRAMUSCULAR; INTRAVENOUS ONCE AS NEEDED
Status: CANCELLED | OUTPATIENT
Start: 2024-05-13

## 2024-04-01 RX ADMIN — SODIUM CHLORIDE 350 MG: 9 INJECTION, SOLUTION INTRAVENOUS at 08:04

## 2024-04-01 NOTE — PROGRESS NOTES
0800 Pt here for inflectra infusion, resting in recliner, TP released and pharmacy notified  0832 Inflectra infusion started to infuse over 2 hrs with filter applied  1029 Inflectra infusion complete, tolerated well, will continue to monitor  1045 pt discharged ambulatory with no adverse reaction noted, pt notified to go to ER with any adverse reactions, AVS given along with medication information  Follow up appt made 4 weeks

## 2024-04-29 ENCOUNTER — INFUSION (OUTPATIENT)
Dept: INFUSION THERAPY | Facility: HOSPITAL | Age: 69
End: 2024-04-29
Attending: DERMATOLOGY
Payer: MEDICARE

## 2024-04-29 VITALS
WEIGHT: 161 LBS | SYSTOLIC BLOOD PRESSURE: 135 MMHG | HEART RATE: 74 BPM | BODY MASS INDEX: 27.64 KG/M2 | RESPIRATION RATE: 17 BRPM | DIASTOLIC BLOOD PRESSURE: 69 MMHG | OXYGEN SATURATION: 96 %

## 2024-04-29 DIAGNOSIS — L40.9 PSORIASIS: Primary | ICD-10-CM

## 2024-04-29 DIAGNOSIS — L40.50 PSORIATIC ARTHRITIS: ICD-10-CM

## 2024-04-29 PROCEDURE — 96415 CHEMO IV INFUSION ADDL HR: CPT

## 2024-04-29 PROCEDURE — 25000003 PHARM REV CODE 250: Performed by: DERMATOLOGY

## 2024-04-29 PROCEDURE — 96413 CHEMO IV INFUSION 1 HR: CPT

## 2024-04-29 PROCEDURE — 63600175 PHARM REV CODE 636 W HCPCS: Mod: JZ,JG | Performed by: DERMATOLOGY

## 2024-04-29 RX ORDER — DIPHENHYDRAMINE HYDROCHLORIDE 50 MG/ML
50 INJECTION INTRAMUSCULAR; INTRAVENOUS ONCE AS NEEDED
Status: DISCONTINUED | OUTPATIENT
Start: 2024-04-29 | End: 2024-04-29 | Stop reason: HOSPADM

## 2024-04-29 RX ORDER — DIPHENHYDRAMINE HYDROCHLORIDE 50 MG/ML
50 INJECTION INTRAMUSCULAR; INTRAVENOUS ONCE AS NEEDED
OUTPATIENT
Start: 2024-05-13

## 2024-04-29 RX ORDER — ACETAMINOPHEN 325 MG/1
650 TABLET ORAL
OUTPATIENT
Start: 2024-05-13

## 2024-04-29 RX ORDER — HEPARIN 100 UNIT/ML
500 SYRINGE INTRAVENOUS
Status: DISCONTINUED | OUTPATIENT
Start: 2024-04-29 | End: 2024-04-29 | Stop reason: HOSPADM

## 2024-04-29 RX ORDER — HEPARIN 100 UNIT/ML
500 SYRINGE INTRAVENOUS
OUTPATIENT
Start: 2024-05-13

## 2024-04-29 RX ORDER — SODIUM CHLORIDE 0.9 % (FLUSH) 0.9 %
10 SYRINGE (ML) INJECTION
OUTPATIENT
Start: 2024-05-13

## 2024-04-29 RX ORDER — ACETAMINOPHEN 325 MG/1
650 TABLET ORAL
Status: DISCONTINUED | OUTPATIENT
Start: 2024-04-29 | End: 2024-04-29 | Stop reason: HOSPADM

## 2024-04-29 RX ORDER — EPINEPHRINE 0.3 MG/.3ML
0.3 INJECTION SUBCUTANEOUS ONCE AS NEEDED
Status: DISCONTINUED | OUTPATIENT
Start: 2024-04-29 | End: 2024-04-29 | Stop reason: HOSPADM

## 2024-04-29 RX ORDER — EPINEPHRINE 0.3 MG/.3ML
0.3 INJECTION SUBCUTANEOUS ONCE AS NEEDED
OUTPATIENT
Start: 2024-05-13

## 2024-04-29 RX ORDER — IPRATROPIUM BROMIDE AND ALBUTEROL SULFATE 2.5; .5 MG/3ML; MG/3ML
3 SOLUTION RESPIRATORY (INHALATION)
OUTPATIENT
Start: 2024-05-13

## 2024-04-29 RX ORDER — SODIUM CHLORIDE 0.9 % (FLUSH) 0.9 %
10 SYRINGE (ML) INJECTION
Status: DISCONTINUED | OUTPATIENT
Start: 2024-04-29 | End: 2024-04-29 | Stop reason: HOSPADM

## 2024-04-29 RX ORDER — CETIRIZINE HYDROCHLORIDE 10 MG/1
10 TABLET ORAL
Status: DISCONTINUED | OUTPATIENT
Start: 2024-04-29 | End: 2024-04-29 | Stop reason: HOSPADM

## 2024-04-29 RX ORDER — CETIRIZINE HYDROCHLORIDE 10 MG/1
10 TABLET ORAL
OUTPATIENT
Start: 2024-05-13

## 2024-04-29 RX ADMIN — SODIUM CHLORIDE 350 MG: 9 INJECTION, SOLUTION INTRAVENOUS at 08:04

## 2024-04-29 NOTE — PROGRESS NOTES
0800 Pt here for Inflectra infusion, resting in recliner, TP released and pharmacy notified  0826 Inflectra infusion started to infuse over 2 hrs with filter applied  1020 Inflectra infusion complete, tolerated well, will continue to monitor  1040 pt discharged ambulatory with no adverse reaction noted, pt notified to go to ER with any adverse reactions, AVS given along with medication information  Follow up appt made 8 weeks

## 2024-06-24 ENCOUNTER — INFUSION (OUTPATIENT)
Dept: INFUSION THERAPY | Facility: HOSPITAL | Age: 69
End: 2024-06-24
Attending: DERMATOLOGY
Payer: MEDICARE

## 2024-06-24 VITALS
BODY MASS INDEX: 27.64 KG/M2 | HEART RATE: 70 BPM | WEIGHT: 161 LBS | DIASTOLIC BLOOD PRESSURE: 83 MMHG | OXYGEN SATURATION: 96 % | SYSTOLIC BLOOD PRESSURE: 131 MMHG

## 2024-06-24 DIAGNOSIS — L40.50 PSORIATIC ARTHRITIS: ICD-10-CM

## 2024-06-24 DIAGNOSIS — L40.9 PSORIASIS: Primary | ICD-10-CM

## 2024-06-24 PROCEDURE — 96415 CHEMO IV INFUSION ADDL HR: CPT

## 2024-06-24 PROCEDURE — 96413 CHEMO IV INFUSION 1 HR: CPT

## 2024-06-24 PROCEDURE — 25000003 PHARM REV CODE 250: Performed by: DERMATOLOGY

## 2024-06-24 RX ORDER — HEPARIN 100 UNIT/ML
500 SYRINGE INTRAVENOUS
Status: DISCONTINUED | OUTPATIENT
Start: 2024-06-24 | End: 2024-06-24 | Stop reason: HOSPADM

## 2024-06-24 RX ORDER — EPINEPHRINE 0.3 MG/.3ML
0.3 INJECTION SUBCUTANEOUS ONCE AS NEEDED
Status: DISCONTINUED | OUTPATIENT
Start: 2024-06-24 | End: 2024-06-24 | Stop reason: HOSPADM

## 2024-06-24 RX ORDER — SODIUM CHLORIDE 0.9 % (FLUSH) 0.9 %
10 SYRINGE (ML) INJECTION
OUTPATIENT
Start: 2024-07-08

## 2024-06-24 RX ORDER — IPRATROPIUM BROMIDE AND ALBUTEROL SULFATE 2.5; .5 MG/3ML; MG/3ML
3 SOLUTION RESPIRATORY (INHALATION)
OUTPATIENT
Start: 2024-07-08

## 2024-06-24 RX ORDER — ACETAMINOPHEN 325 MG/1
650 TABLET ORAL
Status: DISCONTINUED | OUTPATIENT
Start: 2024-06-24 | End: 2024-06-24 | Stop reason: HOSPADM

## 2024-06-24 RX ORDER — CETIRIZINE HYDROCHLORIDE 10 MG/1
10 TABLET ORAL
Status: DISCONTINUED | OUTPATIENT
Start: 2024-06-24 | End: 2024-06-24 | Stop reason: HOSPADM

## 2024-06-24 RX ORDER — DIPHENHYDRAMINE HYDROCHLORIDE 50 MG/ML
50 INJECTION INTRAMUSCULAR; INTRAVENOUS ONCE AS NEEDED
Status: DISCONTINUED | OUTPATIENT
Start: 2024-06-24 | End: 2024-06-24 | Stop reason: HOSPADM

## 2024-06-24 RX ORDER — HEPARIN 100 UNIT/ML
500 SYRINGE INTRAVENOUS
OUTPATIENT
Start: 2024-07-08

## 2024-06-24 RX ORDER — ACETAMINOPHEN 325 MG/1
650 TABLET ORAL
OUTPATIENT
Start: 2024-07-08

## 2024-06-24 RX ORDER — EPINEPHRINE 0.3 MG/.3ML
0.3 INJECTION SUBCUTANEOUS ONCE AS NEEDED
OUTPATIENT
Start: 2024-07-08

## 2024-06-24 RX ORDER — CETIRIZINE HYDROCHLORIDE 10 MG/1
10 TABLET ORAL
OUTPATIENT
Start: 2024-07-08

## 2024-06-24 RX ORDER — SODIUM CHLORIDE 0.9 % (FLUSH) 0.9 %
10 SYRINGE (ML) INJECTION
Status: DISCONTINUED | OUTPATIENT
Start: 2024-06-24 | End: 2024-06-24 | Stop reason: HOSPADM

## 2024-06-24 RX ORDER — DIPHENHYDRAMINE HYDROCHLORIDE 50 MG/ML
50 INJECTION INTRAMUSCULAR; INTRAVENOUS ONCE AS NEEDED
OUTPATIENT
Start: 2024-07-08

## 2024-06-24 RX ADMIN — SODIUM CHLORIDE 350 MG: 9 INJECTION, SOLUTION INTRAVENOUS at 08:06

## 2024-06-24 NOTE — PROGRESS NOTES
Patient arrived for Inflectra infusion today ambulatory to bay. Therapy plan released and pharmacy notified. Patients int started.   0822 infusion started  1010 infusion finished  1010 Int flushed   1020 Patient discharged , given AVS and told to watch for signs and symptoms of adverse reactions , if had any to go to the ER. Patient given upcoming appointment

## 2024-08-19 ENCOUNTER — INFUSION (OUTPATIENT)
Dept: INFUSION THERAPY | Facility: HOSPITAL | Age: 69
End: 2024-08-19
Attending: INTERNAL MEDICINE
Payer: MEDICARE

## 2024-08-19 ENCOUNTER — TELEPHONE (OUTPATIENT)
Dept: DERMATOLOGY | Facility: CLINIC | Age: 69
End: 2024-08-19
Payer: MEDICARE

## 2024-08-19 VITALS
SYSTOLIC BLOOD PRESSURE: 149 MMHG | DIASTOLIC BLOOD PRESSURE: 88 MMHG | BODY MASS INDEX: 27.64 KG/M2 | OXYGEN SATURATION: 97 % | WEIGHT: 161 LBS | RESPIRATION RATE: 17 BRPM | HEART RATE: 88 BPM

## 2024-08-19 DIAGNOSIS — L40.9 PSORIASIS: Primary | ICD-10-CM

## 2024-08-19 DIAGNOSIS — L40.50 PSORIATIC ARTHRITIS: ICD-10-CM

## 2024-08-19 PROCEDURE — 25000003 PHARM REV CODE 250: Performed by: DERMATOLOGY

## 2024-08-19 PROCEDURE — 96413 CHEMO IV INFUSION 1 HR: CPT

## 2024-08-19 PROCEDURE — 63600175 PHARM REV CODE 636 W HCPCS: Mod: JZ,JG | Performed by: DERMATOLOGY

## 2024-08-19 RX ORDER — DIPHENHYDRAMINE HYDROCHLORIDE 50 MG/ML
50 INJECTION INTRAMUSCULAR; INTRAVENOUS ONCE AS NEEDED
Status: DISCONTINUED | OUTPATIENT
Start: 2024-08-19 | End: 2024-08-19 | Stop reason: HOSPADM

## 2024-08-19 RX ORDER — EPINEPHRINE 0.3 MG/.3ML
0.3 INJECTION SUBCUTANEOUS ONCE AS NEEDED
Status: DISCONTINUED | OUTPATIENT
Start: 2024-08-19 | End: 2024-08-19 | Stop reason: HOSPADM

## 2024-08-19 RX ORDER — SODIUM CHLORIDE 0.9 % (FLUSH) 0.9 %
10 SYRINGE (ML) INJECTION
OUTPATIENT
Start: 2024-10-14

## 2024-08-19 RX ORDER — HEPARIN 100 UNIT/ML
500 SYRINGE INTRAVENOUS
OUTPATIENT
Start: 2024-10-14

## 2024-08-19 RX ORDER — ACETAMINOPHEN 325 MG/1
650 TABLET ORAL
OUTPATIENT
Start: 2024-10-14

## 2024-08-19 RX ORDER — IPRATROPIUM BROMIDE AND ALBUTEROL SULFATE 2.5; .5 MG/3ML; MG/3ML
3 SOLUTION RESPIRATORY (INHALATION)
OUTPATIENT
Start: 2024-10-14

## 2024-08-19 RX ORDER — CETIRIZINE HYDROCHLORIDE 10 MG/1
10 TABLET ORAL
OUTPATIENT
Start: 2024-10-14

## 2024-08-19 RX ORDER — HEPARIN 100 UNIT/ML
500 SYRINGE INTRAVENOUS
Status: DISCONTINUED | OUTPATIENT
Start: 2024-08-19 | End: 2024-08-19 | Stop reason: HOSPADM

## 2024-08-19 RX ORDER — EPINEPHRINE 0.3 MG/.3ML
0.3 INJECTION SUBCUTANEOUS ONCE AS NEEDED
OUTPATIENT
Start: 2024-10-14

## 2024-08-19 RX ORDER — DIPHENHYDRAMINE HYDROCHLORIDE 50 MG/ML
50 INJECTION INTRAMUSCULAR; INTRAVENOUS ONCE AS NEEDED
OUTPATIENT
Start: 2024-10-14

## 2024-08-19 RX ORDER — SODIUM CHLORIDE 0.9 % (FLUSH) 0.9 %
10 SYRINGE (ML) INJECTION
Status: DISCONTINUED | OUTPATIENT
Start: 2024-08-19 | End: 2024-08-19 | Stop reason: HOSPADM

## 2024-08-19 RX ADMIN — SODIUM CHLORIDE 350 MG: 9 INJECTION, SOLUTION INTRAVENOUS at 08:08

## 2024-08-19 NOTE — PROGRESS NOTES
0805 Pt here for Inflectra infusion, resting in recliner, TP released and pharmacy notified  0832 Inflectra infusion started to infuse over 2 hrs with filter applied  0937 Inflectra paused due to pt bp increasing, will continue to monitor.    1015 pt blood pressure remains elevated, in addition the the BP increase pt states that she feels like the medication is not helping with her symptoms, she is itchy on her legs where the plaque is normally and her bones hurt.  Dr Blanc made aware of this and orders received to stop infusion since blood pressure has not returned to normal after 30 min and Nurse will call her with possible medication change  1035 pt discharged ambulatory with no adverse reaction noted, pt notified to go to ER with any adverse reactions, AVS given along with medication information  No appt made with possible med change

## 2024-08-21 RX ORDER — DIPHENHYDRAMINE HYDROCHLORIDE 50 MG/ML
50 INJECTION INTRAMUSCULAR; INTRAVENOUS ONCE AS NEEDED
Status: CANCELLED | OUTPATIENT
Start: 2024-09-04

## 2024-08-21 RX ORDER — SODIUM CHLORIDE 0.9 % (FLUSH) 0.9 %
10 SYRINGE (ML) INJECTION
Status: CANCELLED | OUTPATIENT
Start: 2024-09-04

## 2024-08-21 RX ORDER — EPINEPHRINE 0.3 MG/.3ML
0.3 INJECTION SUBCUTANEOUS ONCE AS NEEDED
Status: CANCELLED | OUTPATIENT
Start: 2024-09-04

## 2024-08-21 RX ORDER — METHYLPREDNISOLONE SOD SUCC 125 MG
125 VIAL (EA) INJECTION
Status: CANCELLED | OUTPATIENT
Start: 2024-09-04

## 2024-08-21 RX ORDER — HEPARIN 100 UNIT/ML
500 SYRINGE INTRAVENOUS
Status: CANCELLED | OUTPATIENT
Start: 2024-09-04

## 2024-08-28 ENCOUNTER — TELEPHONE (OUTPATIENT)
Dept: DERMATOLOGY | Facility: CLINIC | Age: 69
End: 2024-08-28
Payer: MEDICARE

## 2024-08-28 ENCOUNTER — OFFICE VISIT (OUTPATIENT)
Dept: DERMATOLOGY | Facility: CLINIC | Age: 69
End: 2024-08-28
Payer: MEDICARE

## 2024-08-28 VITALS — BODY MASS INDEX: 27.48 KG/M2 | RESPIRATION RATE: 18 BRPM | WEIGHT: 160.94 LBS | HEIGHT: 64 IN

## 2024-08-28 DIAGNOSIS — L57.8 OTHER SKIN CHANGES DUE TO CHRONIC EXPOSURE TO NONIONIZING RADIATION: ICD-10-CM

## 2024-08-28 DIAGNOSIS — D22.9 BENIGN NEVUS OF SKIN: ICD-10-CM

## 2024-08-28 DIAGNOSIS — Z79.899 HIGH RISK MEDICATION USE: ICD-10-CM

## 2024-08-28 DIAGNOSIS — L40.9 PSORIASIS: Primary | ICD-10-CM

## 2024-08-28 DIAGNOSIS — L40.50 PSORIATIC ARTHRITIS: ICD-10-CM

## 2024-08-28 DIAGNOSIS — L82.1 SEBORRHEIC KERATOSES: ICD-10-CM

## 2024-08-28 DIAGNOSIS — L43.9 LICHEN PLANUS: Primary | ICD-10-CM

## 2024-08-28 DIAGNOSIS — Z85.828 HISTORY OF NONMELANOMA SKIN CANCER: ICD-10-CM

## 2024-08-28 PROCEDURE — 99214 OFFICE O/P EST MOD 30 MIN: CPT | Mod: ,,, | Performed by: DERMATOLOGY

## 2024-08-28 NOTE — PROGRESS NOTES
[facility]   Socorro Blanc MD    Patient Name: Felicia Meza  Patient YOB: 1955   Date of Service: 8/28/24    CC: Full Skin Exam    HPI: Felicia Meza is a 68 y.o. female presents today for a full skin exam.  Patient was last seen 2/27/2024 and dermatologic history includes NMSC, AK's, and psoriasis. Patient waws previously receiving Remicaide infusion but discontinued a week ago due to increase in blood pressure.     Past Medical History:   Diagnosis Date    Arthritis      Past Surgical History:   Procedure Laterality Date    HYSTEROSCOPY WITH HYDROTHERMAL ABLATION OF ENDOMETRIUM WITH DILATION AND CURETTAGE N/A 1/14/2022    Procedure: HYSTEROSCOPY, WITH DILATION AND CURETTAGE OF UTERUS AND ENDOMETRIAL ABLATION, MYOSURE;  Surgeon: Jazmyne Patrick DO;  Location: [facility], [place];  Service: OB/GYN;  Laterality: N/A;    SKIN CANCER EXCISION      TONSILLECTOMY, ADENOIDECTOMY      TUBAL LIGATION       Review of patient's allergies indicates:   Allergen Reactions    Sulfamethoxazole-trimethoprim Other (See Comments) and Hives     Swelling and whelps    Penicillins Blisters, Rash and Other (See Comments)     When a child, had large blisters in mouth after PCN       Current Outpatient Medications:     calcium carbonate (OS-SIDDHARTH) 500 mg calcium (1,250 mg) tablet, 1 tablet with meals, Disp: , Rfl:     clobetasoL (TEMOVATE) 0.05 % cream, Apply to affected area twice daily, tapering with improvement, Disp: 60 g, Rfl: 2    gabapentin (NEURONTIN) 100 MG capsule, 1 capsule., Disp: , Rfl:     ibuprofen (ADVIL,MOTRIN) 800 MG tablet, every 4 (four) hours as needed., Disp: , Rfl:     NP THYROID 30 mg Tab, , Disp: , Rfl:     oxyCODONE-acetaminophen (PERCOCET) 5-325 mg per tablet, , Disp: , Rfl:     progesterone (PROMETRIUM) 200 MG capsule, , Disp: , Rfl:     SKYRIZI 150 mg/mL PnIj, Inject into the skin., Disp: , Rfl:     traZODone (DESYREL) 100 MG tablet, Take by mouth., Disp: , Rfl:     TREMFYA 100 mg/mL AtIn, ,  Disp: , Rfl:     ROS: A focused review of systems was obtained and negative.     Exam: A full skin exam was performed including scalp, hair, face, neck, chest, back, abdomen, right arm, left arm, right hand, left hand, nails, right leg, and left leg.  All areas examined were normal expect as per below in assessment and plan.  General Appearance of the patient is well developed and well nourished.  Orientation: alert and oriented x 3.  Mood and affect: pleasant.    Assessment:   The primary encounter diagnosis was Psoriasis. Diagnoses of Psoriatic arthritis, High risk medication use, Other skin changes due to chronic exposure to nonionizing radiation, Benign nevus of skin, Seborrheic keratoses, and History of nonmelanoma skin cancer were also pertinent to this visit.    Plan:      Benign Nevus (D22.72)  - Dome shaped regular papule    Plan: Reassurance.    Plan: Counseling.  I counseled the patient regarding the following:  Instructions: Monthly self-skin checks to monitor for any changes in moles are recommended.  Expectations: Benign Nevi are pigmented nests of cells within the skin. No treatment is necessary.  Contact Office if: Any moles change in size, shape or color; itch, burn or bleed.    Seborrheic Keratosis (L82.1)  - Stuck-on, warty, greasy brown papule with pseudo-horn cysts scattered on the trunk and extremities    Plan: Counseling.  I counseled the patient regarding the following:  Skin Care: Seborrheic Keratoses are benign. No treatment is necessary.  Expectations: Seborrheic Keratoses are benign warty growths. Patients get more of them as they age    Plan: Reassurance    Plaque Psoriasis  - psoriasiform plaques with micaceous scale  Status: Inadequately controlled  BSA% 15    Plan: Counseling  I counseled the patient regarding the following:  Skin care: Emollients, ambient sun exposure, shampoos with tar, selenium or zinc pyrithione can improve psoriasis.  Expectations: Psoriasis is chronic in nature  with periods of remissions and flares. Flares can be triggered by stress, infections (group A strep), certain medications and alcohol.  Contact office if: Psoriasis worsens, or fails to improve despite several months of treatment.    - failed MTX, remicade, humira and tremfya  - will start skyrizi     Psoriatic Arthritis  - Psoriasiform plaques with micaceous scale  Status: Inadequately controlled    Plan: Counseling.  I counseled the patient regarding the following:  Instructions: Systemic medications are the treatment of choice for psoriatic arthritis. Treatment options include  anti-TNF therapy and methotrexate.  Expectations: Psoriatic arthritis is a type of inflammatory arthritis which occurs in conjunction with psoriasis.  Approximately 5% of psoriasis patients develop psoriatic arthritis. Psoriatic arthritis is chronic in nature with  periods of remissions and flares. Flares can be triggered by stress, infections (group A strep), certain medications  and alcohol. Psoriatic arthritis requires systemic medication for adequate treatment.  Contact office if: Your psoriatic arthritis worsens, or fails to improve despite several months of treatment.    History of non-melanoma skin cancer (Z85.828)  - Well healed scar with NER  Associated diagnosis: Medical surveillance following completed treatment    Plan: Monitoring.    High Risk Medication Monitoring (Z79.899) : The risks and benefits of the medication were reviewed in full with the patient. Should any side effects occur, the patient will stop the medication and contact me immediately.    Skyrizi Counseling: I discussed with the patient the risks of risankizumab-rzaa including but not limited to immunosuppression, and serious infections. The patient understands that monitoring is required including a PPD at baseline and must alert us or the primary physician if symptoms of infection or other concerning signs are noted.  - Gave sample of Skyrizi   Lot:  [id number]  Exp: 10/2025    Follow up in about 6 months (around 2/28/2025) for Skin Check .    Socorro Blanc MD

## 2024-08-28 NOTE — PATIENT INSTRUCTIONS
Reminders Regarding Your Injections:  Keep your medicine refrigerated in the original container at 36 to 46 degrees F. Do not freeze.  Do not use medication if it is frozen, , if the outer seal is broken, cloudy, discolored, or has flakes or particles in it. Do not drop medication. Call your pharmacist if this occurs.  Keep your medicine away from children.  Take your medicine out of the fridge for 30 minutes before injection while leaving it in the packaging. Do not warm the medication any other way. Use within the hour.  Wash your hands before injection and clean the injection site thoroughly with alcohol. Do not shake medicine.  Injection sites include the belly (2 inches away from belly button) or tops of thighs where fat can be pinched.  Do not inject into skin that is scarred, hard, scaly, tender, bruised or has skin plaques.  Do not throw your needles in the trash! Dispose of them in a sharps container or a heavy-duty plastic container with a lid that is upright and stable, leak-resistant and labeled as hazardous. Follow your community guidelines when disposing of sharps container located on the FDA's website www.fda.gov.  Do not attempt to re-use needles after injection.  Inject medicine at a 90 degree angle. Rotate injection sites. Do not get rid of the air bubble in the syringe. Do not inject through your clothes.  There may be liquid at the injection site. Do not rub injection site. You may apply gentle pressure with a cotton ball or cover the area with a bandaid for any bleeding.  If you have any questions, reach out to your pharmacist, Dr. Blanc's office, of the official support website of your medication.

## 2024-09-23 ENCOUNTER — CLINICAL SUPPORT (OUTPATIENT)
Dept: DERMATOLOGY | Facility: CLINIC | Age: 69
End: 2024-09-23
Payer: MEDICARE

## 2024-09-23 VITALS — BODY MASS INDEX: 27.48 KG/M2 | RESPIRATION RATE: 18 BRPM | HEIGHT: 64 IN | WEIGHT: 160.94 LBS

## 2024-09-23 DIAGNOSIS — Z79.899 HIGH RISK MEDICATION USE: ICD-10-CM

## 2024-09-23 DIAGNOSIS — L40.9 PSORIASIS: Primary | ICD-10-CM

## 2024-09-23 NOTE — PROGRESS NOTES
Plan: Biologic Injection Training  Who Received Training: The Patient  Medication: Skyrixi    We discussed Skirizi injection. I demonstrated how to clean the skin and administer the medication.    Patient administered 150mg/mL into the right abdomen    Sample Gurmeet provided     Lot: [id number]  Exp: Oct. 2025

## 2024-11-30 ENCOUNTER — OFFICE VISIT (OUTPATIENT)
Dept: FAMILY MEDICINE | Facility: CLINIC | Age: 69
End: 2024-11-30
Payer: MEDICARE

## 2024-11-30 ENCOUNTER — APPOINTMENT (OUTPATIENT)
Dept: RADIOLOGY | Facility: CLINIC | Age: 69
End: 2024-11-30
Attending: NURSE PRACTITIONER
Payer: MEDICARE

## 2024-11-30 VITALS
HEIGHT: 64 IN | OXYGEN SATURATION: 96 % | BODY MASS INDEX: 27.66 KG/M2 | SYSTOLIC BLOOD PRESSURE: 154 MMHG | HEART RATE: 86 BPM | WEIGHT: 162 LBS | DIASTOLIC BLOOD PRESSURE: 82 MMHG | RESPIRATION RATE: 20 BRPM | TEMPERATURE: 98 F

## 2024-11-30 DIAGNOSIS — Z20.822 EXPOSURE TO COVID-19 VIRUS: Primary | ICD-10-CM

## 2024-11-30 DIAGNOSIS — R06.2 WHEEZING: ICD-10-CM

## 2024-11-30 DIAGNOSIS — J22 LOWER RESPIRATORY TRACT INFECTION: ICD-10-CM

## 2024-11-30 DIAGNOSIS — R05.1 ACUTE COUGH: ICD-10-CM

## 2024-11-30 LAB
CTP QC/QA: YES
SARS-COV-2 RDRP RESP QL NAA+PROBE: NEGATIVE

## 2024-11-30 PROCEDURE — 99203 OFFICE O/P NEW LOW 30 MIN: CPT | Mod: ,,, | Performed by: NURSE PRACTITIONER

## 2024-11-30 PROCEDURE — 87635 SARS-COV-2 COVID-19 AMP PRB: CPT | Mod: RHCUB | Performed by: NURSE PRACTITIONER

## 2024-11-30 PROCEDURE — 71046 X-RAY EXAM CHEST 2 VIEWS: CPT | Mod: TC,RHCUB | Performed by: NURSE PRACTITIONER

## 2024-11-30 RX ORDER — ALBUTEROL SULFATE 90 UG/1
2 INHALANT RESPIRATORY (INHALATION) EVERY 6 HOURS PRN
Qty: 18 G | Refills: 0 | Status: SHIPPED | OUTPATIENT
Start: 2024-11-30

## 2024-11-30 RX ORDER — AZITHROMYCIN 500 MG/1
500 TABLET, FILM COATED ORAL DAILY
Qty: 5 TABLET | Refills: 0 | Status: SHIPPED | OUTPATIENT
Start: 2024-11-30 | End: 2024-12-05

## 2024-11-30 RX ORDER — PROMETHAZINE HYDROCHLORIDE AND DEXTROMETHORPHAN HYDROBROMIDE 6.25; 15 MG/5ML; MG/5ML
5 SYRUP ORAL EVERY 6 HOURS PRN
Qty: 240 ML | Refills: 0 | Status: SHIPPED | OUTPATIENT
Start: 2024-11-30 | End: 2024-12-10

## 2024-11-30 NOTE — PROGRESS NOTES
Subjective:       Patient ID: Felicia Meza is a 69 y.o. female.    Chief Complaint: COVID-19 Concerns (At home positive test, cc of cough,cp,sob , headache x0ultww )    cough,chest congestion and tightness with headache x 3 weeks- tested positive for Covid 3 weeks ago      Review of Systems   Constitutional:  Negative for appetite change, fatigue and fever.   HENT:  Positive for nasal congestion. Negative for ear pain and sore throat.    Eyes:  Negative for pain, discharge and itching.   Respiratory:  Positive for cough and chest tightness. Negative for shortness of breath.    Cardiovascular:  Negative for chest pain and leg swelling.   Gastrointestinal:  Negative for abdominal pain, change in bowel habit, nausea and vomiting.   Musculoskeletal:  Negative for back pain, gait problem and neck pain.   Integumentary:  Negative for rash and wound.   Allergic/Immunologic: Negative for immunocompromised state.   Neurological:  Positive for headaches. Negative for dizziness and weakness.   All other systems reviewed and are negative.        Objective:      Physical Exam  Vitals and nursing note reviewed.   Constitutional:       General: She is not in acute distress.     Appearance: Normal appearance. She is not ill-appearing, toxic-appearing or diaphoretic.   HENT:      Head: Normocephalic.      Right Ear: Tympanic membrane, ear canal and external ear normal.      Left Ear: Tympanic membrane, ear canal and external ear normal.      Nose: Congestion present. No rhinorrhea.      Mouth/Throat:      Mouth: Mucous membranes are moist.      Pharynx: No oropharyngeal exudate or posterior oropharyngeal erythema.   Eyes:      General: No scleral icterus.        Right eye: No discharge.         Left eye: No discharge.      Extraocular Movements: Extraocular movements intact.      Conjunctiva/sclera: Conjunctivae normal.      Pupils: Pupils are equal, round, and reactive to light.   Cardiovascular:      Rate and Rhythm: Normal rate  and regular rhythm.      Pulses: Normal pulses.      Heart sounds: Normal heart sounds. No murmur heard.  Pulmonary:      Effort: No respiratory distress.      Breath sounds: Wheezing and rhonchi present. No rales.   Musculoskeletal:         General: Normal range of motion.      Cervical back: Neck supple. No tenderness.   Lymphadenopathy:      Cervical: No cervical adenopathy.   Skin:     General: Skin is warm and dry.      Capillary Refill: Capillary refill takes less than 2 seconds.      Findings: No rash.   Neurological:      Mental Status: She is alert and oriented to person, place, and time.   Psychiatric:         Mood and Affect: Mood normal.         Behavior: Behavior normal.         Thought Content: Thought content normal.         Judgment: Judgment normal.            Assessment:       1. Exposure to COVID-19 virus    2. Acute cough    3. Lower respiratory tract infection    4. Wheezing        Plan:   Exposure to COVID-19 virus  -     POCT COVID-19 Rapid Screening    Acute cough  -     X-Ray Chest PA And Lateral; Future; Expected date: 11/30/2024  -     albuterol (VENTOLIN HFA) 90 mcg/actuation inhaler; Inhale 2 puffs into the lungs every 6 (six) hours as needed for Wheezing or Shortness of Breath. Rescue  Dispense: 18 g; Refill: 0  -     promethazine-dextromethorphan (PROMETHAZINE-DM) 6.25-15 mg/5 mL Syrp; Take 5 mLs by mouth every 6 (six) hours as needed (cough).  Dispense: 240 mL; Refill: 0    Lower respiratory tract infection  -     azithromycin (ZITHROMAX) 500 MG tablet; Take 1 tablet (500 mg total) by mouth once daily. for 5 days  Dispense: 5 tablet; Refill: 0    Wheezing  -     albuterol (VENTOLIN HFA) 90 mcg/actuation inhaler; Inhale 2 puffs into the lungs every 6 (six) hours as needed for Wheezing or Shortness of Breath. Rescue  Dispense: 18 g; Refill: 0           Risks, benefits, and side effects were discussed with the patient. All questions were answered to the fullest satisfaction of the  patient, and pt verbalized understanding and agreement to treatment plan. Pt was to call with any new or worsening symptoms, or present to the ER

## 2024-12-31 ENCOUNTER — OFFICE VISIT (OUTPATIENT)
Dept: FAMILY MEDICINE | Facility: CLINIC | Age: 69
End: 2024-12-31
Payer: MEDICARE

## 2024-12-31 VITALS
OXYGEN SATURATION: 99 % | BODY MASS INDEX: 29.02 KG/M2 | DIASTOLIC BLOOD PRESSURE: 71 MMHG | HEIGHT: 64 IN | TEMPERATURE: 99 F | WEIGHT: 170 LBS | HEART RATE: 84 BPM | SYSTOLIC BLOOD PRESSURE: 129 MMHG | RESPIRATION RATE: 18 BRPM

## 2024-12-31 DIAGNOSIS — Z13.1 DIABETES MELLITUS SCREENING: ICD-10-CM

## 2024-12-31 DIAGNOSIS — R63.5 WEIGHT GAIN: Primary | ICD-10-CM

## 2024-12-31 DIAGNOSIS — E03.9 HYPOTHYROIDISM, UNSPECIFIED TYPE: ICD-10-CM

## 2024-12-31 DIAGNOSIS — G47.9 DIFFICULTY SLEEPING: Chronic | ICD-10-CM

## 2024-12-31 DIAGNOSIS — E66.3 OVERWEIGHT WITH BODY MASS INDEX (BMI) 25.0-29.9: Chronic | ICD-10-CM

## 2024-12-31 DIAGNOSIS — Z79.899 OTHER LONG TERM (CURRENT) DRUG THERAPY: ICD-10-CM

## 2024-12-31 DIAGNOSIS — E55.9 VITAMIN D DEFICIENCY: Chronic | ICD-10-CM

## 2024-12-31 PROBLEM — J22 LOWER RESPIRATORY TRACT INFECTION: Status: RESOLVED | Noted: 2024-11-30 | Resolved: 2024-12-31

## 2024-12-31 PROBLEM — R05.1 ACUTE COUGH: Status: RESOLVED | Noted: 2024-11-30 | Resolved: 2024-12-31

## 2024-12-31 PROBLEM — R06.2 WHEEZING: Status: RESOLVED | Noted: 2024-11-30 | Resolved: 2024-12-31

## 2024-12-31 PROBLEM — L40.9 PSORIASIS: Chronic | Status: ACTIVE | Noted: 2024-02-29

## 2024-12-31 PROBLEM — Z20.822 EXPOSURE TO COVID-19 VIRUS: Status: RESOLVED | Noted: 2024-11-30 | Resolved: 2024-12-31

## 2024-12-31 PROBLEM — L40.50 PSORIATIC ARTHRITIS: Chronic | Status: ACTIVE | Noted: 2024-02-29

## 2024-12-31 PROCEDURE — 99214 OFFICE O/P EST MOD 30 MIN: CPT | Mod: ,,, | Performed by: NURSE PRACTITIONER

## 2024-12-31 RX ORDER — TRAZODONE HYDROCHLORIDE 100 MG/1
100 TABLET ORAL NIGHTLY PRN
Qty: 90 TABLET | Refills: 1 | Status: SHIPPED | OUTPATIENT
Start: 2024-12-31

## 2024-12-31 NOTE — PROGRESS NOTES
[facility]  [address] [place] [address]  Phone: [phone]  Fax: [phone]       PATIENT NAME: Felicia Meza   : 1955    AGE: 69 y.o. DATE OF ENCOUNTER: 24    MRN: [id number]      PCP: Angie Wong FNP    Subjective:     Reason for Visit / Chief Complaint:     274}  Chief Complaint   Patient presents with    Establish Care     Patient presents to clinic to establish care.     Health Maintenance     Care gaps addressed.  Shingles vaccine done at [facility] or [facility].  Wants to hold off on flu shot today until she feels better.      HPI:  Patient reports gaining approximately 30 lbs over the past 2-3 years, with current weight at 170 lbs. She estimates her healthy weight to be around 130 lbs. Patient expresses difficulty in movement and feels encumbered by the extra weight. She has attempted calorie restriction to 1,200 per day with minimal weight loss and acknowledges decreased physical activity, potentially contributing to weight gain.    Patient has a history of thyroid problems but does not attribute all weight gain to this. She reports prior steroid use, but weight gain preceded this treatment. Patient has arthritis affecting mobility, though not considered severe. She recently resumed Skyrizi for psoriatic arthritis per Dermatologist, Dr. Socorro Blanc.     Patient expresses concern about recent high blood pressure, though current reading is 129/71. She reports elevated blood pressure during an urgent care visit on .    Wants to resume trazodone which has been helpful in the past for difficulty sleeping.    Patient reports ongoing psoriasis issues, particularly on her heel, causing pain and bleeding. Various treatments have been unsuccessful, and she considers the possibility of a fungal infection.    Patient notes physical changes, including breast size increase from B to E cup, causing discomfort.    Patient denies excessive eating or  having diabetes.      ROS:  Constitutional: +weight gain         Allergies and Meds: 274}     Review of patient's allergies indicates:   Allergen Reactions    Sulfamethoxazole-trimethoprim Other (See Comments) and Hives     Swelling and whelps    Penicillins Blisters, Rash and Other (See Comments)     When a child, had large blisters in mouth after PCN        Current Outpatient Medications   Medication Sig Dispense Refill    calcium carbonate (OS-SIDDHARTH) 500 mg calcium (1,250 mg) tablet 1 tablet with meals      SKYRIZI 150 mg/mL PnIj Inject into the skin.      traZODone (DESYREL) 100 MG tablet Take 1 tablet (100 mg total) by mouth nightly as needed for Insomnia. 90 tablet 1     No current facility-administered medications for this visit.       Labs:274}   I have reviewed labs below:    Lab Results   Component Value Date    WBC 8.85 01/10/2022    RBC 4.50 01/10/2022    HGB 13.8 01/10/2022    HCT 42.8 01/10/2022     (H) 01/10/2022     01/10/2022    K 4.8 01/10/2022     (H) 01/10/2022    CALCIUM 9.0 01/10/2022    GLU 88 01/10/2022    BUN 10 01/10/2022    CREATININE 0.55 01/10/2022    EGFRNONAA 118 01/10/2022    ALT 22 01/10/2022    AST 15 01/10/2022    CHOL 191 12/15/2021    TRIG 71 12/15/2021    HDL 52 12/15/2021    LDLCALC 125 12/15/2021    TSH 1.360 12/15/2021    HGBA1C 5.5 12/15/2021       Medical History: 274}     Past Medical History:   Diagnosis Date    Arthritis       Social History     Tobacco Use   Smoking Status Never   Smokeless Tobacco Never      Past Surgical History:   Procedure Laterality Date    HYSTEROSCOPY WITH HYDROTHERMAL ABLATION OF ENDOMETRIUM WITH DILATION AND CURETTAGE N/A 1/14/2022    Procedure: HYSTEROSCOPY, WITH DILATION AND CURETTAGE OF UTERUS AND ENDOMETRIAL ABLATION, MYOSURE;  Surgeon: Jazmyne Patrick DO;  Location: [facility];  Service: OB/GYN;  Laterality: N/A;    SKIN CANCER EXCISION      TONSILLECTOMY, ADENOIDECTOMY      TUBAL LIGATION          Objective:  274}  "  Vital Signs  Temp: 99.2 °F (37.3 °C)  Temp Source: Oral  Pulse: 84  Resp: 18  SpO2: 99 %  BP: 129/71  BP Location: Left arm  Patient Position: Sitting  Pain Score: 0-No pain  Height and Weight  Height: 5' 4" (162.6 cm)  Weight: 77.1 kg (170 lb)  BSA (Calculated - sq m): 1.87 sq meters  BMI (Calculated): 29.2  Weight in (lb) to have BMI = 25: 145.3    Over the last two weeks how often have you been bothered by little interest or pleasure in doing things: 0  Over the last two weeks how often have you been bothered by feeling down, depressed or hopeless: 0  PHQ-2 Total Score: 0    Wt Readings from Last 3 Encounters:   12/31/24 77.1 kg (170 lb)   11/30/24 73.5 kg (162 lb)   09/23/24 73 kg (160 lb 15 oz)     Physical Exam  Vitals and nursing note reviewed.   Constitutional:       General: She is not in acute distress.     Appearance: Normal appearance. She is not ill-appearing.   HENT:      Head: Normocephalic.      Right Ear: Tympanic membrane, ear canal and external ear normal.      Left Ear: Tympanic membrane, ear canal and external ear normal.      Nose: Nose normal.      Mouth/Throat:      Mouth: Mucous membranes are moist.      Pharynx: Oropharynx is clear. Uvula midline. No posterior oropharyngeal erythema or uvula swelling.   Eyes:      Conjunctiva/sclera: Conjunctivae normal.      Pupils: Pupils are equal, round, and reactive to light.   Neck:      Thyroid: No thyroid mass or thyromegaly.      Vascular: Normal carotid pulses. No carotid bruit.   Cardiovascular:      Rate and Rhythm: Normal rate and regular rhythm.      Pulses: Normal pulses.      Heart sounds: Normal heart sounds.   Pulmonary:      Effort: Pulmonary effort is normal. No respiratory distress.      Breath sounds: Normal breath sounds. No wheezing, rhonchi or rales.   Abdominal:      Palpations: Abdomen is soft.      Tenderness: There is no abdominal tenderness.   Musculoskeletal:      Cervical back: Neck supple.      Right lower leg: No edema. "      Left lower leg: No edema.   Lymphadenopathy:      Cervical: No cervical adenopathy.   Skin:     General: Skin is warm and dry.      Coloration: Skin is not jaundiced or pale.   Neurological:      General: No focal deficit present.      Mental Status: She is alert and oriented to person, place, and time.      Gait: Gait normal.   Psychiatric:         Mood and Affect: Mood normal.         Behavior: Behavior normal.          Assessment and Plan: 274}       1. Weight gain  -     CBC Auto Differential; Future; Expected date: 12/31/2024  -     Comprehensive Metabolic Panel; Future; Expected date: 12/31/2024  -     T4, Free; Future; Expected date: 12/31/2024  -     TSH; Future; Expected date: 12/31/2024  -     T3, Free; Future; Expected date: 12/31/2024    2. Overweight with body mass index (BMI) 25.0-29.9  -     Hemoglobin A1C; Future; Expected date: 12/31/2024    3. Vitamin D deficiency  -     Vitamin D; Future; Expected date: 12/31/2024    4. Other long term (current) drug therapy  -     CBC Auto Differential; Future; Expected date: 12/31/2024  -     Comprehensive Metabolic Panel; Future; Expected date: 12/31/2024  -     Lipid Panel; Future; Expected date: 12/31/2024  -     Hemoglobin A1C; Future; Expected date: 12/31/2024  -     Vitamin B12 & Folate; Future; Expected date: 12/31/2024    5. Diabetes mellitus screening  -     Hemoglobin A1C; Future; Expected date: 12/31/2024    6. Hypothyroidism, unspecified type  -     T4, Free; Future; Expected date: 12/31/2024  -     TSH; Future; Expected date: 12/31/2024  -     T3, Free; Future; Expected date: 12/31/2024    7. Difficulty sleeping  -     traZODone (DESYREL) 100 MG tablet; Take 1 tablet (100 mg total) by mouth nightly as needed for Insomnia.  Dispense: 90 tablet; Refill: 1        Assessment & Plan    IMPRESSION:  - Considering weight gain and associated health concerns; exploring potential thyroid issues and vitamin D deficiency as contributing factors  -  Evaluating blood pressure, which appears improved today but may be fluctuating  - Assessed psoriatic arthritis management; patient currently on Skyrizi prescribed by Dr. Socorro Blanc  - Discussed Medicare will not cover weight management with semaglutide, but considering pending lab results and optimization of other factors, could pursue on-line treatment with semaglutide.    ARTHRITIS:  - Noted the patient reports having arthritis.    VITAMIN D DEFICIENCY:  - Noted the patient reports being vitamin D deficient in the past.  - Plan to check vitamin D levels.    SLEEP DISORDER:  - Resume Trazodone for sleep.  - Will send prescription for Trazodone.    THYROID DISORDER:  - Noted the patient reports past thyroid problems and was on NP thyroid in the past.  - Plan to check thyroid levels.    WEIGHT MANAGEMENT:  - Discussed the impact of hormonal changes on weight, particularly during menopause.  - Acknowledged potential menopausal changes affecting weight and muscle mass.  - Discussed the importance of protein intake for women over 40 to maintain muscle mass..  - Recommend prioritizing protein intake, making dietary changes, and incorporating exercise.  - Suggested considering semaglutide for weight management if other interventions don't work.  -Discussed possibly incorporating [employer] supplements if deemed beneficial: Gold for inflammation; Brew for energy and appetite control; Ignite for energy and appetite control; Drive can help with hormonal regulation.    OTHER INSTRUCTIONS:  - Patient to increase water intake.    LABS:  - Labs ordered: B12, cholesterol, liver function, kidney function.  - Follow up on Friday morning for fasting labs due to no  in clinic today.    FOLLOW UP:  - Follow up in 6 weeks (February) for Medicare appointment to reassess weight and overall progress.  - Contact the office for any needed advice or support in weight management efforts.         Signature:  Angie Wong,  FNP-BC    Future Appointments   Date Time Provider Department Center   2/26/2025  9:00 AM AWCARROLL NURSE, [facility] FAMILY MEDICINE [facility] Granda [place]   3/3/2025  8:30 AM Socorro Blanc MD [facility]     This note was generated with the assistance of ambient listening technology. Verbal consent was obtained by the patient and accompanying visitor(s) for the recording of patient appointment to facilitate this note. I attest to having reviewed and edited the generated note for accuracy, though some syntax or spelling errors may persist. Please contact the author of this note for any clarification.

## 2025-01-03 ENCOUNTER — CLINICAL SUPPORT (OUTPATIENT)
Dept: FAMILY MEDICINE | Facility: CLINIC | Age: 70
End: 2025-01-03
Payer: MEDICARE

## 2025-01-03 DIAGNOSIS — Z23 FLU VACCINE NEED: Primary | ICD-10-CM

## 2025-02-20 NOTE — PROGRESS NOTES
" [facility]  [address] [place] [address]  Phone: [phone]  Fax: [phone]     PATIENT NAME: Felicia Meza   : 1955    AGE: 69 y.o. DATE OF ENCOUNTER: 25    Provider:    MRN: [id number]      Felicia Meza presented for an initial Medicare AWV today. The following components were reviewed and updated:    Medical history  Family History  Social history  Allergies and Current Medications  Health Risk Assessment  Health Maintenance  Care Team    **See Completed Assessments for Annual Wellness visit with in the encounter summary    The following assessments were completed:  Depression Screening  Cognitive function Screening  Timed Get Up Test  Whisper Test      Opioid documentation:      Patient does not have a current opioid prescription.          Vitals:    25 0918   BP: 132/82   Patient Position: Sitting   Pulse: 78   Resp: 16   Temp: 98.3 °F (36.8 °C)   TempSrc: Oral   SpO2: 97%   Weight: 74.8 kg (165 lb)   Height: 5' 3" (1.6 m)     Body mass index is 29.23 kg/m².       Physical Exam  Vitals and nursing note reviewed.   Constitutional:       General: She is not in acute distress.     Appearance: Normal appearance. She is not ill-appearing.   HENT:      Head: Normocephalic.   Eyes:      Conjunctiva/sclera: Conjunctivae normal.   Cardiovascular:      Rate and Rhythm: Normal rate and regular rhythm.      Heart sounds: Normal heart sounds.   Pulmonary:      Effort: Pulmonary effort is normal. No respiratory distress.      Breath sounds: Normal breath sounds.   Skin:     General: Skin is warm and dry.      Coloration: Skin is not jaundiced or pale.   Neurological:      Mental Status: She is alert and oriented to person, place, and time.      Gait: Gait normal.   Psychiatric:         Mood and Affect: Mood normal.         Behavior: Behavior normal.         Thought Content: Thought content normal.         Judgment: Judgment normal.         Diagnoses and health " risks identified today and associated recommendations/orders:  1. Encounter for initial annual wellness visit (AWV) in Medicare patient    2. Psoriatic arthritis  Assessment & Plan:  Stable, followed by Dermatology, Dr. Socorro Blanc.  On Skyrizi.      3. Psoriasis  Assessment & Plan:  Stable, followed by Dermatology, Dr. Socorro Blanc.  On Skyrizi.      4. BMI 29.0-29.9,adult    5. Need for vaccination  -     diphth,pertus,acell,,tetanus (BOOSTRIX) 2.5-8-5 Lf-mcg-Lf/0.5mL Susp; Inject 0.5 mLs into the muscle once. for 1 dose  Dispense: 0.5 mL; Refill: 0  -     RSVPreF3 antigen-AS01E, PF, (AREXVY) 120 mcg/0.5 mL SusR vaccine; Inject 0.5 mLs into the muscle once. for 1 dose  Dispense: 0.5 mL; Refill: 0    6. Encounter for screening mammogram for malignant neoplasm of breast  -     Mammo Digital Screening Bilat w/ Manuel (XPD); Future; Expected date: 02/26/2025    7. Screening for malignant neoplasm of colon  -     Cologuard Screening (Multitarget Stool DNA); Future; Expected date: 02/26/2025    8. Overweight with body mass index (BMI) 25.0-29.9  Assessment & Plan:  Wt Readings from Last 3 Encounters:   02/26/25 74.8 kg (165 lb)   12/31/24 77.1 kg (170 lb)   11/30/24 73.5 kg (162 lb)     Increased physical activity.  Has lost 5 lbs with lifestyle changes since previous visit.      9. Reduced mobility  Overview:  Due to aging and psoriatic arthritis.    Assessment & Plan:  Stable, intentional increase in walking.  Fall precautions to prevent injury.           Provided Felicia with a 5-10 year written screening schedule and personal prevention plan. Recommendations were developed using the USPSTF age appropriate recommendations. Education, counseling, and referrals were provided as needed.  After Visit Summary printed and given to patient which includes a list of additional screenings\tests needed.      Follow up in about 6 months (around 8/26/2025) for Medicare AWV, and otherwise follow-up as routinely scheduled.    Signature:   SIMÓN Canas-BC    Future Appointments   Date Time Provider Department Center   3/3/2025  8:30 AM Socorro Blanc MD [facility] DERM [place]   4/23/2025  2:00 PM [facility] MAMMO1 [facility] MAMMO [facility]   8/27/2025 10:40 AM Angie Wong FNP [facility] [place]   3/2/2026  8:00 AM [facility]       Covid vaccine - declined, Tetanus vaccine- order sent to pharmacy, RSV vaccine - order sent to pharmacy per request,Mammogram - ordered with appointment given this visit, BMD- declined, Colon screening- Cologuard screening ordered this visit.    I offered to discuss advanced care planning, including how to pick a person who would make decisions for you if you were unable to make them for yourself, called a health care power of , and what kind of decisions you might make such as use of life sustaining treatments such as ventilators and tube feeding when faced with a life limiting illness recorded on a living will that they will need to know. (How you want to be cared for as you near the end of your natural life)     X  Patient is unwilling to engage in a discussion regarding advance directives at this time.

## 2025-02-26 ENCOUNTER — OFFICE VISIT (OUTPATIENT)
Dept: FAMILY MEDICINE | Facility: CLINIC | Age: 70
End: 2025-02-26
Payer: MEDICARE

## 2025-02-26 VITALS
HEIGHT: 63 IN | SYSTOLIC BLOOD PRESSURE: 132 MMHG | RESPIRATION RATE: 16 BRPM | DIASTOLIC BLOOD PRESSURE: 82 MMHG | BODY MASS INDEX: 29.23 KG/M2 | HEART RATE: 78 BPM | TEMPERATURE: 98 F | WEIGHT: 165 LBS | OXYGEN SATURATION: 97 %

## 2025-02-26 DIAGNOSIS — Z23 NEED FOR VACCINATION: ICD-10-CM

## 2025-02-26 DIAGNOSIS — Z74.09 REDUCED MOBILITY: Chronic | ICD-10-CM

## 2025-02-26 DIAGNOSIS — Z12.31 ENCOUNTER FOR SCREENING MAMMOGRAM FOR MALIGNANT NEOPLASM OF BREAST: ICD-10-CM

## 2025-02-26 DIAGNOSIS — Z00.00 ENCOUNTER FOR INITIAL ANNUAL WELLNESS VISIT (AWV) IN MEDICARE PATIENT: Primary | ICD-10-CM

## 2025-02-26 DIAGNOSIS — E66.3 OVERWEIGHT WITH BODY MASS INDEX (BMI) 25.0-29.9: Chronic | ICD-10-CM

## 2025-02-26 DIAGNOSIS — L40.9 PSORIASIS: Chronic | ICD-10-CM

## 2025-02-26 DIAGNOSIS — Z12.11 SCREENING FOR MALIGNANT NEOPLASM OF COLON: ICD-10-CM

## 2025-02-26 DIAGNOSIS — L40.50 PSORIATIC ARTHRITIS: Chronic | ICD-10-CM

## 2025-02-26 PROBLEM — E55.9 VITAMIN D DEFICIENCY: Chronic | Status: RESOLVED | Noted: 2024-12-31 | Resolved: 2025-02-26

## 2025-02-26 PROBLEM — R63.5 WEIGHT GAIN: Status: RESOLVED | Noted: 2024-12-31 | Resolved: 2025-02-26

## 2025-02-26 PROCEDURE — G0439 PPPS, SUBSEQ VISIT: HCPCS | Mod: ,,, | Performed by: NURSE PRACTITIONER

## 2025-02-26 NOTE — ASSESSMENT & PLAN NOTE
Wt Readings from Last 3 Encounters:   02/26/25 74.8 kg (165 lb)   12/31/24 77.1 kg (170 lb)   11/30/24 73.5 kg (162 lb)     Increased physical activity.  Has lost 5 lbs with lifestyle changes since previous visit.

## 2025-02-26 NOTE — PATIENT INSTRUCTIONS
Counseling and Referral of Other Preventative  (Italic type indicates deductible and co-insurance are waived)    Patient Name: Felicia Meza  Today's Date: 2/26/2025    Health Maintenance       Date Due Completion Date    TETANUS VACCINE Never done ---    DEXA Scan Never done ---    Colorectal Cancer Screening Never done ---    Mammogram 12/06/2022 12/6/2021    COVID-19 Vaccine (4 - 2024-25 season) 09/01/2024 12/7/2022    Hemoglobin A1c (Diabetic Prevention Screening) 01/03/2028 1/3/2025    Lipid Panel 01/03/2030 1/3/2025    RSV Vaccine (Age 60+ and Pregnant patients) (1 - 1-dose 75+ series) 11/03/2032 ---        No orders of the defined types were placed in this encounter.    The following information is provided to all patients.  This information is to help you find resources for any of the problems found today that may be affecting your health:                  Living healthy guide: [place].gov    Understanding Diabetes: www.diabetes.org      Eating healthy: www.cdc.gov/healthyweight      CDC home safety checklist: www.cdc.gov/steadi/patient.html      Agency on Aging: [place].gov    Alcoholics anonymous (AA): www.aa.org      Physical Activity: www.carli.nih.gov/xh7sqwb      Tobacco use: [place].gov

## 2025-03-03 ENCOUNTER — OFFICE VISIT (OUTPATIENT)
Dept: DERMATOLOGY | Facility: CLINIC | Age: 70
End: 2025-03-03
Payer: MEDICARE

## 2025-03-03 VITALS — BODY MASS INDEX: 29.21 KG/M2 | HEIGHT: 63 IN | WEIGHT: 164.88 LBS | RESPIRATION RATE: 18 BRPM

## 2025-03-03 DIAGNOSIS — L57.8 OTHER SKIN CHANGES DUE TO CHRONIC EXPOSURE TO NONIONIZING RADIATION: Primary | ICD-10-CM

## 2025-03-03 DIAGNOSIS — L40.9 PSORIASIS: ICD-10-CM

## 2025-03-03 DIAGNOSIS — Z79.899 HIGH RISK MEDICATION USE: ICD-10-CM

## 2025-03-03 DIAGNOSIS — L57.0 ACTINIC KERATOSES: ICD-10-CM

## 2025-03-03 DIAGNOSIS — Z85.828 HISTORY OF NONMELANOMA SKIN CANCER: ICD-10-CM

## 2025-03-03 DIAGNOSIS — L82.1 SEBORRHEIC KERATOSES: ICD-10-CM

## 2025-03-03 DIAGNOSIS — L40.50 PSORIATIC ARTHRITIS: ICD-10-CM

## 2025-03-03 DIAGNOSIS — D48.5 NEOPLASM OF UNCERTAIN BEHAVIOR OF SKIN: ICD-10-CM

## 2025-03-03 DIAGNOSIS — D22.9 BENIGN NEVUS OF SKIN: ICD-10-CM

## 2025-03-03 PROCEDURE — 88305 TISSUE EXAM BY PATHOLOGIST: CPT | Mod: TC,SUR | Performed by: DERMATOLOGY

## 2025-03-03 PROCEDURE — 88305 TISSUE EXAM BY PATHOLOGIST: CPT | Mod: 26,,, | Performed by: PATHOLOGY

## 2025-03-03 NOTE — PROGRESS NOTES
"[facility]   Socorro Blanc MD    Patient Name: Felicia Meza  Patient YOB: 1955   Date of Service: 3/3/25    CC: Full Skin Exam    HPI: Felicia Meza is a 69 y.o. female presents today for a full skin exam.  Patient was last seen 8/28/2024 and dermatologic history includes NMSC, AK"s,and psoriasis. Patient is concerned today about a lesion located on the right .  It has been present for 3 month(s). It has not been treated in the past.  Patient is also concerned about lesion located on the chest.    Past Medical History:   Diagnosis Date    Arthritis     Vitamin D deficiency 12/31/2024     Past Surgical History:   Procedure Laterality Date    BACK SURGERY      HYSTEROSCOPY WITH HYDROTHERMAL ABLATION OF ENDOMETRIUM WITH DILATION AND CURETTAGE N/A 01/14/2022    Procedure: HYSTEROSCOPY, WITH DILATION AND CURETTAGE OF UTERUS AND ENDOMETRIAL ABLATION, MYOSURE;  Surgeon: Jazmyne Patrick DO;  Location: [facility];  Service: OB/GYN;  Laterality: N/A;    SKIN CANCER EXCISION      TONSILLECTOMY, ADENOIDECTOMY      TUBAL LIGATION       Review of patient's allergies indicates:   Allergen Reactions    Sulfamethoxazole-trimethoprim Other (See Comments) and Hives     Swelling and whelps    Penicillins Blisters, Rash and Other (See Comments)     When a child, had large blisters in mouth after PCN     Current Medications[1]    ROS: A focused review of systems was obtained and negative.     Exam: A full skin exam was performed including scalp, hair, face, neck, chest, back, abdomen, right arm, left arm, right hand, left hand, nails, right leg, and left leg.  All areas examined were normal expect as per below in assessment and plan.  General Appearance of the patient is well developed and well nourished.  Orientation: alert and oriented x 3.  Mood and affect: pleasant.    Assessment:   The primary encounter diagnosis was Other skin changes due to chronic exposure to nonionizing radiation. Diagnoses of " Actinic keratoses, Benign nevus of skin, High risk medication use, History of nonmelanoma skin cancer, Psoriasis, Psoriatic arthritis, Seborrheic keratoses, and Neoplasm of uncertain behavior of skin were also pertinent to this visit.    Plan:      Benign Nevus (D22.72)  - Dome shaped regular papule    Plan: Reassurance.    Plan: Counseling.  I counseled the patient regarding the following:  Instructions: Monthly self-skin checks to monitor for any changes in moles are recommended.  Expectations: Benign Nevi are pigmented nests of cells within the skin. No treatment is necessary.  Contact Office if: Any moles change in size, shape or color; itch, burn or bleed.    Seborrheic Keratosis (L82.1)  - Stuck-on, warty, greasy brown papule with pseudo-horn cysts scattered on the trunk and extremities    Plan: Counseling.  I counseled the patient regarding the following:  Skin Care: Seborrheic Keratoses are benign. No treatment is necessary.  Expectations: Seborrheic Keratoses are benign warty growths. Patients get more of them as they age    Plan: Reassurance    .Actinic Keratoses(L57.0)  - Erythematous patches and papules with hyperkeratotic scale distributed on the right brow.    Plan: Counseling.  I counseled the patient regarding the following:  Skin Care: Sun protective clothing and broad spectrum sunscreen can prevent the formation of Actinic  Keratoses. AKs can resolve with cryotherapy, photodynamic therapy, imiquimod, topical 5-FU.  Expectations: Actinic Keratoses are precancerous proliferations that occur within sun damaged skin. If untreated,  a small subset of AKs can develop into Squamous Cell Carcinoma.  Contact Office if: If AKs fail to resolve despite treatment, or if you develop a side effect from therapy, such as  unbearable crusting, scabbing, redness and tenderness.    Plan: Liquid Nitrogen.  A total of 3 lesions were treated with liquid nitrogen for 2 freeze-thaw cycles lasting 5 seconds, located on the  above locations.   The patient's consent was obtained including but not limited to risks of crusting, scabbing,  blistering, scarring, darker or lighter pigmentary change, recurrence, incomplete removal and infection.    Plaque Psoriasis  - psoriasiform plaques with micaceous scale  Status: Well Controlled    Plan: Counseling  I counseled the patient regarding the following:  Skin care: Emollients, ambient sun exposure, shampoos with tar, selenium or zinc pyrithione can improve psoriasis.  Expectations: Psoriasis is chronic in nature with periods of remissions and flares. Flares can be triggered by stress, infections (group A strep), certain medications and alcohol.  Contact office if: Psoriasis worsens, or fails to improve despite several months of treatment.  - Continue Skyrizi    Psoriatic Arthritis  - Psoriasiform plaques with micaceous scale  Status: Well Controlled    Plan: Counseling.  I counseled the patient regarding the following:  Instructions: Systemic medications are the treatment of choice for psoriatic arthritis. Treatment options include  anti-TNF therapy and methotrexate.  Expectations: Psoriatic arthritis is a type of inflammatory arthritis which occurs in conjunction with psoriasis.  Approximately 5% of psoriasis patients develop psoriatic arthritis. Psoriatic arthritis is chronic in nature with  periods of remissions and flares. Flares can be triggered by stress, infections (group A strep), certain medications  and alcohol. Psoriatic arthritis requires systemic medication for adequate treatment.  Contact office if: Your psoriatic arthritis worsens, or fails to improve despite several months of treatment.    Neoplasm of Uncertain Behavior (D48.5)  - keratotic papule located on the sternal chest  Ddx includes: AK vs SCC vs ISK     Plan: Counseling.  I counseled the patient regarding the following:  Instructions: Neoplasms of Uncertain Behavior can be observed, biopsied or surgically removed  depending on the  level of clinical suspicion.  Instructions: Neoplasms of Uncertain Behavior can be observed, biopsied or surgically removed depending on the  level of clinical suspicion.  Contact Office if: patient develops any new lesions that fail to heal, ulcerate or bleed.    Plan: Biopsy by Shave Method.  Location (1): sternal chest   Written consent was obtained and risks were reviewed including but not  limited to scarring, infection, bleeding, scabbing, incomplete removal, nerve damage and allergy to anesthesia.  The area was prepped with Chloraprep. Local anesthesia was obtained with approximately 0.5cc of 1% lidocaine  with epinephrine. A biopsy by shave method to the level of the dermis (sent for H and E) was performed using  a Dermablade on the above location. Aluminum chloride was used for hemostasis. Following the biopsy  Petrolatum and a bandage were applied. Patient will be notified of biopsy results. However, patient instructed to  call the office if not contacted within 2 weeks.    History of non-melanoma skin cancer (Z85.828)  - Well healed scar with NER  Associated diagnosis: Medical surveillance following completed treatment    Plan: Monitoring.    High Risk Medication Monitoring (Z79.899) : The risks and benefits of the medication were reviewed in full with the patient. Should any side effects occur, the patient will stop the medication and contact me immediately.  Skyrizmeka Counseling: I discussed with the patient the risks of risankizumab-rzaa including but not limited to immunosuppression, and serious infections. The patient understands that monitoring is required including a PPD at baseline and must alert us or the primary physician if symptoms of infection or other concerning signs are noted.    Other Skin Changes Due to Chronic Exposure of Nonionizing Radiation (L57.8)    Plan: Monitoring.     Plan: Sunscreen Recommendations.  I recommended a broad spectrum sunscreen with a SPF of 30 or  higher. I explained that SPF 30 sunscreens block approximately 97 percent of the  sun's harmful rays. Sunscreens should be applied at least 15 minutes prior to expected sun exposure and then every 2 hours after that as long as  sun exposure continues. If swimming or exercising sunscreen should be reapplied every 45 minutes to an hour after getting wet or sweating. One  ounce, or the equivalent of a shot glass full of sunscreen, is adequate to protect the skin not covered by a bathing suit. I also recommended a lip  balm with a sunscreen as well. Sun protective clothing can be used in lieu of sunscreen but must be worn the entire time you are exposed to the  sun's rays.      Follow up in about 9 months (around 12/3/2025).    Socorro Blanc MD           [1]   Current Outpatient Medications:     calcium carbonate (OS-SIDDHARTH) 500 mg calcium (1,250 mg) tablet, 1 tablet with meals, Disp: , Rfl:     SKYRIZI 150 mg/mL PnIj, Inject into the skin., Disp: , Rfl:     traZODone (DESYREL) 100 MG tablet, Take 1 tablet (100 mg total) by mouth nightly as needed for Insomnia., Disp: 90 tablet, Rfl: 1

## 2025-03-05 ENCOUNTER — RESULTS FOLLOW-UP (OUTPATIENT)
Dept: DERMATOLOGY | Facility: CLINIC | Age: 70
End: 2025-03-05

## 2025-03-11 ENCOUNTER — RESULTS FOLLOW-UP (OUTPATIENT)
Dept: FAMILY MEDICINE | Facility: CLINIC | Age: 70
End: 2025-03-11

## 2025-03-18 ENCOUNTER — PROCEDURE VISIT (OUTPATIENT)
Dept: DERMATOLOGY | Facility: CLINIC | Age: 70
End: 2025-03-18
Payer: MEDICARE

## 2025-03-18 VITALS
SYSTOLIC BLOOD PRESSURE: 134 MMHG | WEIGHT: 164.88 LBS | HEART RATE: 84 BPM | HEIGHT: 63 IN | BODY MASS INDEX: 29.21 KG/M2 | DIASTOLIC BLOOD PRESSURE: 81 MMHG

## 2025-03-18 DIAGNOSIS — C44.92 SQUAMOUS CELL CARCINOMA OF SKIN: Primary | ICD-10-CM

## 2025-03-18 PROCEDURE — 88305 TISSUE EXAM BY PATHOLOGIST: CPT | Mod: 26,,, | Performed by: PATHOLOGY

## 2025-03-18 PROCEDURE — 88305 TISSUE EXAM BY PATHOLOGIST: CPT | Mod: TC,SUR | Performed by: DERMATOLOGY

## 2025-03-18 PROCEDURE — 11602 EXC TR-EXT MAL+MARG 1.1-2 CM: CPT | Mod: 51,,, | Performed by: DERMATOLOGY

## 2025-03-18 PROCEDURE — 12032 INTMD RPR S/A/T/EXT 2.6-7.5: CPT | Mod: ,,, | Performed by: DERMATOLOGY

## 2025-03-18 NOTE — PATIENT INSTRUCTIONS
Excision Site Care  Starting tomorrow you may shower and wash the area with dial antibacterial soap  Pat dry and apply vaseline and a bandaid  Perform this routine for three days   May leave unbandaged after these three days  The area will be irritated, sore, slightly red, and may itch, sting, or burn  Do not apply make-up to the area until it is healed  There will be a scar  The area will take 1-2 weeks to heal  No swimming or soaking in the tub or hot tub until stitches are removed  Tylenol is primarily recommended for pain  If pain is uncontrolled with Tylenol then patient may use Advil, Ibuprofen, Aleve, Motrin, or Aspirin

## 2025-03-18 NOTE — PROGRESS NOTES
[facility]   Socorro Blanc MD    Patient Name: Felicia Meza  Patient YOB: 1955   Date of Service: 3/18/25    CC: Excision    HPI: Felicia Meza is a 69 y.o. female here today for excision of SCC, located on the sternal chest.      Past Medical History:   Diagnosis Date    Arthritis     Vitamin D deficiency 12/31/2024     Past Surgical History:   Procedure Laterality Date    BACK SURGERY      HYSTEROSCOPY WITH HYDROTHERMAL ABLATION OF ENDOMETRIUM WITH DILATION AND CURETTAGE N/A 01/14/2022    Procedure: HYSTEROSCOPY, WITH DILATION AND CURETTAGE OF UTERUS AND ENDOMETRIAL ABLATION, MYOSURE;  Surgeon: Jazmyne Patrick DO;  Location: [facility];  Service: OB/GYN;  Laterality: N/A;    SKIN CANCER EXCISION      TONSILLECTOMY, ADENOIDECTOMY      TUBAL LIGATION       Review of patient's allergies indicates:   Allergen Reactions    Sulfamethoxazole-trimethoprim Other (See Comments) and Hives     Swelling and whelps    Penicillins Blisters, Rash and Other (See Comments)     When a child, had large blisters in mouth after PCN     Current Medications[1]    ROS: A focused review of systems was obtained and negative.     Exam: A focused skin exam was performed and the biopsy site was identified.  General Appearance of the patient is well developed and well nourished.  Orientation: alert and oriented x 3.  Mood and affect: pleasant.    Vitals:    03/18/25 0919   BP: 134/81   Pulse: 84       Assessment:   The encounter diagnosis was Squamous cell carcinoma of skin.    Plan:  Excision  Accession Number: [id number]  Biopsy Photograph Reviewed: Yes    Procedure Note    Location (A): sternal chest  Preop Size: 0.7 x 0.5  Margin: 0.4 cm  Total Excised Diameter: 1.5 x 1.3 cm  Procedure: Excision - Elliptical  Anesthesia: local infiltration-1% lidocaine with epinephrine (12 cc)  Estimated Blood Loss: minimal  Complications: none    Repair Type: Intermediate  Repair Length: 4 cm    Consent was obtained from the  patient. The risks and benefits to therapy were discussed in detail. Specifically, the risks of infection, scarring, bleeding, prolonged wound healing, incomplete removal, allergy to anesthesia, nerve injury and recurrence were addressed. Prior to the procedure, the treatment site was clearly identified and confirmed by the patient. All components of Universal Protocol/PAUSE Rule completed.    Procedure: The patient was placed in a comfortable position exposing the surgical site. The area was prepped with Hibiclens and draped in the usual fashion. An elliptical excision was performed, on the above listed location The margin was drawn around the clinically apparent lesion. An elliptical shape was then drawn on the skin incorporating the lesion and margins. Incisions were then made along these lines to the appropriate tissue plane and the lesion was extirpated. A 15 blade was used. The lesion was excised to the layer of the adipose tissue, removed and sent to pathology for processing and histologic evaluation.    Intermediate layered repair was performed because closure of the subcutaneous tissue and superficial non-muscular fascia was required, because damaged skin surrounding defect made closure difficult, because extensive undermining required, and because Burow's triangles were required. Undermining was performed with blunt dissection. Hemostasis was achieved with electrocautery. The subcutaneous tissue and dermis were closed with 3-0 Vicryl (interrupted). Epidermal closure was achieved with 4-0 Ethilon(interrupted). Petrolatum + dry sterile dressing were applied. I reviewed with the patient in detail post-care instructions. Patient is not to engage in any heavy lifting, exercise, or swimming for the next 14 days. Should the patient develop any fevers, chills, bleeding, severe pain patient will contact the office immediately. Suture removal in 10.           Follow up in about 10 days (around 3/28/2025) for SR  .    Socorro Blanc MD           [1]   Current Outpatient Medications:     calcium carbonate (OS-SIDDHARTH) 500 mg calcium (1,250 mg) tablet, 1 tablet with meals, Disp: , Rfl:     SKYRIZI 150 mg/mL PnIj, Inject into the skin., Disp: , Rfl:     traZODone (DESYREL) 100 MG tablet, Take 1 tablet (100 mg total) by mouth nightly as needed for Insomnia., Disp: 90 tablet, Rfl: 1

## 2025-03-20 ENCOUNTER — RESULTS FOLLOW-UP (OUTPATIENT)
Dept: DERMATOLOGY | Facility: CLINIC | Age: 70
End: 2025-03-20

## 2025-03-26 ENCOUNTER — CLINICAL SUPPORT (OUTPATIENT)
Dept: DERMATOLOGY | Facility: CLINIC | Age: 70
End: 2025-03-26
Payer: MEDICARE

## 2025-03-26 DIAGNOSIS — Z48.02 ENCOUNTER FOR REMOVAL OF SUTURES: Primary | ICD-10-CM

## 2025-03-26 PROCEDURE — 87070 CULTURE OTHR SPECIMN AEROBIC: CPT | Mod: ,,, | Performed by: CLINICAL MEDICAL LABORATORY

## 2025-03-26 NOTE — PROGRESS NOTES
[facility]   Socorro Blanc MD    Patient Name: Felicia Meza  Patient YOB: 1955   Date of Service: 3/26/25    CC: Suture removal    HPI: Felicia Meza is a 69 y.o. female here today for suture removal on the sternal chest.  The area is healing well.  Patient denies fever, chills, puss, or redness to the area.    Past Medical History:   Diagnosis Date    Arthritis     Vitamin D deficiency 12/31/2024     Past Surgical History:   Procedure Laterality Date    BACK SURGERY      HYSTEROSCOPY WITH HYDROTHERMAL ABLATION OF ENDOMETRIUM WITH DILATION AND CURETTAGE N/A 01/14/2022    Procedure: HYSTEROSCOPY, WITH DILATION AND CURETTAGE OF UTERUS AND ENDOMETRIAL ABLATION, MYOSURE;  Surgeon: Jazmyne Patrick DO;  Location: [facility];  Service: OB/GYN;  Laterality: N/A;    SKIN CANCER EXCISION      TONSILLECTOMY, ADENOIDECTOMY      TUBAL LIGATION       Review of patient's allergies indicates:   Allergen Reactions    Sulfamethoxazole-trimethoprim Other (See Comments) and Hives     Swelling and whelps    Penicillins Blisters, Rash and Other (See Comments)     When a child, had large blisters in mouth after PCN     Current Medications[1]      Exam: A focused skin exam was performed. All areas examined were normal except as mentioned in the assessment and plan below.  General Appearance of the patient is well developed and well nourished.  Orientation: alert and oriented x 3.  Mood and affect: pleasant.    Assessment:   The encounter diagnosis was Encounter for removal of sutures.    Plan:   Suture Removal (Global Period)  Body Locations: Sternal chest  I reviewed the pathology results with the patient in detail.  The examination of the site was clean, dry and intact. Sutures were removed.  Vaseline applied.    I counseled the patient regarding the following:  Expectations: Sutured wounds tend to have 50% of the strength of normal skin at the time of suture removal. Try avoiding rigorous exercise or  lifting greater than 10 pounds.  Contact office if: you develop pain, redness, tenderness or pus at the surgical site.    A culture was obtained from the area           Follow up if symptoms worsen or fail to improve.    Amanda Shannon RN             [1]   Current Outpatient Medications:     calcium carbonate (OS-SIDDHARTH) 500 mg calcium (1,250 mg) tablet, 1 tablet with meals, Disp: , Rfl:     SKYRIZI 150 mg/mL PnIj, Inject into the skin., Disp: , Rfl:     traZODone (DESYREL) 100 MG tablet, Take 1 tablet (100 mg total) by mouth nightly as needed for Insomnia., Disp: 90 tablet, Rfl: 1

## 2025-03-28 ENCOUNTER — RESULTS FOLLOW-UP (OUTPATIENT)
Dept: DERMATOLOGY | Facility: CLINIC | Age: 70
End: 2025-03-28

## 2025-03-28 LAB — MICROORGANISM SPEC CULT: NORMAL

## 2025-04-23 ENCOUNTER — HOSPITAL ENCOUNTER (OUTPATIENT)
Dept: RADIOLOGY | Facility: HOSPITAL | Age: 70
Discharge: HOME | End: 2025-04-23
Attending: NURSE PRACTITIONER
Payer: MEDICARE

## 2025-04-23 DIAGNOSIS — Z12.31 ENCOUNTER FOR SCREENING MAMMOGRAM FOR MALIGNANT NEOPLASM OF BREAST: ICD-10-CM

## 2025-04-23 PROCEDURE — 77067 SCR MAMMO BI INCL CAD: CPT | Mod: TC

## 2025-04-23 PROCEDURE — 77063 BREAST TOMOSYNTHESIS BI: CPT | Mod: 26,,, | Performed by: RADIOLOGY

## 2025-04-23 PROCEDURE — 77067 SCR MAMMO BI INCL CAD: CPT | Mod: 26,,, | Performed by: RADIOLOGY

## 2025-06-24 ENCOUNTER — TELEPHONE (OUTPATIENT)
Dept: FAMILY MEDICINE | Facility: CLINIC | Age: 70
End: 2025-06-24
Payer: MEDICARE

## 2025-06-24 NOTE — TELEPHONE ENCOUNTER
This Rx request for trazodone was received from her pharmacy and has already been refilled.    Msg to  to notify pt prescription/refill has been sent.

## 2025-06-24 NOTE — TELEPHONE ENCOUNTER
Msg to  to notify pt prescription/refill has been sent.Copied from CRM #[id number]. Topic: Medications - Medication Refill  >> Jun 24, 2025  8:29 AM Isamar wrote:  Who Called: Felicia Meza    Refill or New Rx:Refill  RX Name and Strength:traZODone (DESYREL) 100 MG tablet  How is the patient currently taking it? (ex. 1XDay):Take 1 tablet (100 mg total) by mouth nightly as needed for Insomnia. - Oral  Is this a 30 day or 90 day RX:30  Local or Mail Order:local  List of preferred pharmacies on file (remove unneeded): [unfilled]  If different Pharmacy is requested, enter Pharmacy information here including location and phone number: [place], [place] - [address]  [address] [facility] [address]  Phone: [phone] Fax: [phone]  Hours: Not open 24 hours       Ordering Provider:Angie Wong FNP      Preferred Method of Contact: Phone Call  Patient's Preferred Phone Number on File: [phone]

## 2025-08-27 ENCOUNTER — OFFICE VISIT (OUTPATIENT)
Dept: FAMILY MEDICINE | Facility: CLINIC | Age: 70
End: 2025-08-27
Payer: MEDICARE

## 2025-08-27 VITALS
SYSTOLIC BLOOD PRESSURE: 124 MMHG | TEMPERATURE: 99 F | WEIGHT: 162 LBS | DIASTOLIC BLOOD PRESSURE: 78 MMHG | BODY MASS INDEX: 28.7 KG/M2 | OXYGEN SATURATION: 95 % | HEART RATE: 78 BPM | RESPIRATION RATE: 20 BRPM | HEIGHT: 63 IN

## 2025-08-27 DIAGNOSIS — H53.9 VISION CHANGES: ICD-10-CM

## 2025-08-27 DIAGNOSIS — R42 DIZZINESS: Primary | ICD-10-CM

## 2025-08-27 DIAGNOSIS — G47.9 DIFFICULTY SLEEPING: Chronic | ICD-10-CM

## 2025-08-27 PROCEDURE — 99213 OFFICE O/P EST LOW 20 MIN: CPT | Mod: ,,, | Performed by: NURSE PRACTITIONER

## 2025-08-27 RX ORDER — TRAZODONE HYDROCHLORIDE 100 MG/1
100 TABLET ORAL NIGHTLY PRN
Qty: 90 TABLET | Refills: 1 | Status: SHIPPED | OUTPATIENT
Start: 2025-08-27

## (undated) DEVICE — Device

## (undated) DEVICE — MYOSURE SINGLE SEAL SET

## (undated) DEVICE — SET IRRIG CYSTO/BLADDER 78IN

## (undated) DEVICE — GLOVE SURGICAL PROTEXIS PI SIZE 6.5

## (undated) DEVICE — GLOVE SURGICAL PROTEXIS PI SIZE 6

## (undated) DEVICE — CDS LITHOTOMY

## (undated) DEVICE — SOL IRRIGATION SALINE 3000ML BAG

## (undated) DEVICE — MYOSURE TISSUE REMOVAL DEVICE